# Patient Record
Sex: MALE | Race: WHITE | Employment: OTHER | URBAN - METROPOLITAN AREA
[De-identification: names, ages, dates, MRNs, and addresses within clinical notes are randomized per-mention and may not be internally consistent; named-entity substitution may affect disease eponyms.]

---

## 2020-01-27 ENCOUNTER — HOSPITAL ENCOUNTER (INPATIENT)
Age: 70
LOS: 2 days | Discharge: HOME | DRG: 068 | End: 2020-01-29
Attending: EMERGENCY MEDICINE | Admitting: STUDENT IN AN ORGANIZED HEALTH CARE EDUCATION/TRAINING PROGRAM
Payer: MEDICARE

## 2020-01-27 ENCOUNTER — APPOINTMENT (OUTPATIENT)
Dept: CT IMAGING | Age: 70
DRG: 068 | End: 2020-01-27
Attending: EMERGENCY MEDICINE
Payer: MEDICARE

## 2020-01-27 DIAGNOSIS — G45.3 AMAUROSIS FUGAX OF LEFT EYE: ICD-10-CM

## 2020-01-27 DIAGNOSIS — I65.23 BILATERAL CAROTID ARTERY STENOSIS: ICD-10-CM

## 2020-01-27 DIAGNOSIS — G45.3 AMAUROSIS FUGAX OF RIGHT EYE: ICD-10-CM

## 2020-01-27 DIAGNOSIS — G45.9 TIA (TRANSIENT ISCHEMIC ATTACK): Primary | ICD-10-CM

## 2020-01-27 PROBLEM — I63.9 CVA (CEREBRAL VASCULAR ACCIDENT) (HCC): Status: ACTIVE | Noted: 2020-01-27

## 2020-01-27 LAB
ALBUMIN SERPL-MCNC: 4.1 G/DL (ref 3.5–5)
ALBUMIN/GLOB SERPL: 1.2 {RATIO} (ref 1.1–2.2)
ALP SERPL-CCNC: 79 U/L (ref 45–117)
ALT SERPL-CCNC: 31 U/L (ref 12–78)
ANION GAP SERPL CALC-SCNC: 6 MMOL/L (ref 5–15)
AST SERPL-CCNC: 21 U/L (ref 15–37)
ATRIAL RATE: 77 BPM
BASOPHILS # BLD: 0.1 K/UL (ref 0–0.1)
BASOPHILS NFR BLD: 2 % (ref 0–1)
BILIRUB SERPL-MCNC: 0.6 MG/DL (ref 0.2–1)
BUN SERPL-MCNC: 10 MG/DL (ref 6–20)
BUN/CREAT SERPL: 9 (ref 12–20)
CALCIUM SERPL-MCNC: 9.3 MG/DL (ref 8.5–10.1)
CALCULATED P AXIS, ECG09: -27 DEGREES
CALCULATED R AXIS, ECG10: -11 DEGREES
CALCULATED T AXIS, ECG11: 14 DEGREES
CHLORIDE SERPL-SCNC: 104 MMOL/L (ref 97–108)
CO2 SERPL-SCNC: 28 MMOL/L (ref 21–32)
COMMENT, HOLDF: NORMAL
CREAT SERPL-MCNC: 1.11 MG/DL (ref 0.7–1.3)
DIAGNOSIS, 93000: NORMAL
DIFFERENTIAL METHOD BLD: ABNORMAL
EOSINOPHIL # BLD: 0.7 K/UL (ref 0–0.4)
EOSINOPHIL NFR BLD: 8 % (ref 0–7)
ERYTHROCYTE [DISTWIDTH] IN BLOOD BY AUTOMATED COUNT: 12.2 % (ref 11.5–14.5)
GLOBULIN SER CALC-MCNC: 3.3 G/DL (ref 2–4)
GLUCOSE SERPL-MCNC: 123 MG/DL (ref 65–100)
HCT VFR BLD AUTO: 47.5 % (ref 36.6–50.3)
HGB BLD-MCNC: 15.6 G/DL (ref 12.1–17)
IMM GRANULOCYTES # BLD AUTO: 0 K/UL (ref 0–0.04)
IMM GRANULOCYTES NFR BLD AUTO: 0 % (ref 0–0.5)
INR PPP: 1 (ref 0.9–1.1)
LYMPHOCYTES # BLD: 2.2 K/UL (ref 0.8–3.5)
LYMPHOCYTES NFR BLD: 29 % (ref 12–49)
MCH RBC QN AUTO: 31.2 PG (ref 26–34)
MCHC RBC AUTO-ENTMCNC: 32.8 G/DL (ref 30–36.5)
MCV RBC AUTO: 95 FL (ref 80–99)
MONOCYTES # BLD: 0.6 K/UL (ref 0–1)
MONOCYTES NFR BLD: 8 % (ref 5–13)
NEUTS SEG # BLD: 4.1 K/UL (ref 1.8–8)
NEUTS SEG NFR BLD: 53 % (ref 32–75)
NRBC # BLD: 0 K/UL (ref 0–0.01)
NRBC BLD-RTO: 0 PER 100 WBC
P-R INTERVAL, ECG05: 346 MS
PLATELET # BLD AUTO: 178 K/UL (ref 150–400)
PMV BLD AUTO: 12.4 FL (ref 8.9–12.9)
POTASSIUM SERPL-SCNC: 3.8 MMOL/L (ref 3.5–5.1)
PROT SERPL-MCNC: 7.4 G/DL (ref 6.4–8.2)
PROTHROMBIN TIME: 10.2 SEC (ref 9–11.1)
Q-T INTERVAL, ECG07: 424 MS
QRS DURATION, ECG06: 98 MS
QTC CALCULATION (BEZET), ECG08: 479 MS
RBC # BLD AUTO: 5 M/UL (ref 4.1–5.7)
SAMPLES BEING HELD,HOLD: NORMAL
SODIUM SERPL-SCNC: 138 MMOL/L (ref 136–145)
VENTRICULAR RATE, ECG03: 77 BPM
WBC # BLD AUTO: 7.7 K/UL (ref 4.1–11.1)

## 2020-01-27 PROCEDURE — 99285 EMERGENCY DEPT VISIT HI MDM: CPT

## 2020-01-27 PROCEDURE — 74011636320 HC RX REV CODE- 636/320: Performed by: EMERGENCY MEDICINE

## 2020-01-27 PROCEDURE — 65660000000 HC RM CCU STEPDOWN

## 2020-01-27 PROCEDURE — 85610 PROTHROMBIN TIME: CPT

## 2020-01-27 PROCEDURE — 80053 COMPREHEN METABOLIC PANEL: CPT

## 2020-01-27 PROCEDURE — 74011000258 HC RX REV CODE- 258: Performed by: EMERGENCY MEDICINE

## 2020-01-27 PROCEDURE — 93005 ELECTROCARDIOGRAM TRACING: CPT

## 2020-01-27 PROCEDURE — 70498 CT ANGIOGRAPHY NECK: CPT

## 2020-01-27 PROCEDURE — 70496 CT ANGIOGRAPHY HEAD: CPT

## 2020-01-27 PROCEDURE — 74011250637 HC RX REV CODE- 250/637: Performed by: EMERGENCY MEDICINE

## 2020-01-27 PROCEDURE — 36415 COLL VENOUS BLD VENIPUNCTURE: CPT

## 2020-01-27 PROCEDURE — 70450 CT HEAD/BRAIN W/O DYE: CPT

## 2020-01-27 PROCEDURE — 85025 COMPLETE CBC W/AUTO DIFF WBC: CPT

## 2020-01-27 PROCEDURE — 94760 N-INVAS EAR/PLS OXIMETRY 1: CPT

## 2020-01-27 RX ORDER — ATORVASTATIN CALCIUM 40 MG/1
40 TABLET, FILM COATED ORAL
Status: DISCONTINUED | OUTPATIENT
Start: 2020-01-27 | End: 2020-01-28

## 2020-01-27 RX ORDER — THERA TABS 400 MCG
1 TAB ORAL DAILY
COMMUNITY
End: 2021-04-09

## 2020-01-27 RX ORDER — ACETAMINOPHEN 325 MG/1
650 TABLET ORAL
Status: DISCONTINUED | OUTPATIENT
Start: 2020-01-27 | End: 2020-01-29 | Stop reason: HOSPADM

## 2020-01-27 RX ORDER — MELATONIN
2000 DAILY
Status: DISCONTINUED | OUTPATIENT
Start: 2020-01-28 | End: 2020-01-29 | Stop reason: HOSPADM

## 2020-01-27 RX ORDER — GUAIFENESIN 100 MG/5ML
325 LIQUID (ML) ORAL
Status: COMPLETED | OUTPATIENT
Start: 2020-01-27 | End: 2020-01-27

## 2020-01-27 RX ORDER — ATORVASTATIN CALCIUM 20 MG/1
20 TABLET, FILM COATED ORAL
Status: ON HOLD | COMMUNITY
Start: 2019-12-05 | End: 2020-01-29 | Stop reason: SDUPTHER

## 2020-01-27 RX ORDER — FLUTICASONE PROPIONATE 50 MCG
2 SPRAY, SUSPENSION (ML) NASAL DAILY
Status: DISCONTINUED | OUTPATIENT
Start: 2020-01-28 | End: 2020-01-29 | Stop reason: HOSPADM

## 2020-01-27 RX ORDER — ONDANSETRON 2 MG/ML
4 INJECTION INTRAMUSCULAR; INTRAVENOUS
Status: DISCONTINUED | OUTPATIENT
Start: 2020-01-27 | End: 2020-01-29 | Stop reason: HOSPADM

## 2020-01-27 RX ORDER — GLUCOSAMINE SULFATE 1500 MG
1000 POWDER IN PACKET (EA) ORAL 2 TIMES DAILY
COMMUNITY
Start: 2019-11-30

## 2020-01-27 RX ORDER — CLOPIDOGREL BISULFATE 75 MG/1
150 TABLET ORAL
Status: COMPLETED | OUTPATIENT
Start: 2020-01-27 | End: 2020-01-27

## 2020-01-27 RX ORDER — THERA TABS 400 MCG
1 TAB ORAL DAILY
Status: DISCONTINUED | OUTPATIENT
Start: 2020-01-28 | End: 2020-01-29 | Stop reason: HOSPADM

## 2020-01-27 RX ORDER — ACETAMINOPHEN 650 MG/1
650 SUPPOSITORY RECTAL
Status: DISCONTINUED | OUTPATIENT
Start: 2020-01-27 | End: 2020-01-29 | Stop reason: HOSPADM

## 2020-01-27 RX ORDER — ASPIRIN 81 MG/1
81 TABLET ORAL DAILY
COMMUNITY
Start: 2008-03-31 | End: 2020-01-29

## 2020-01-27 RX ORDER — LOSARTAN POTASSIUM 50 MG/1
100 TABLET ORAL DAILY
Status: DISCONTINUED | OUTPATIENT
Start: 2020-01-28 | End: 2020-01-29 | Stop reason: HOSPADM

## 2020-01-27 RX ORDER — LOSARTAN POTASSIUM AND HYDROCHLOROTHIAZIDE 25; 100 MG/1; MG/1
1 TABLET ORAL DAILY
COMMUNITY
Start: 2019-11-30 | End: 2020-11-06

## 2020-01-27 RX ORDER — ALBUTEROL SULFATE 90 UG/1
2 AEROSOL, METERED RESPIRATORY (INHALATION)
COMMUNITY
End: 2021-02-26 | Stop reason: SDUPTHER

## 2020-01-27 RX ORDER — SODIUM CHLORIDE 0.9 % (FLUSH) 0.9 %
10 SYRINGE (ML) INJECTION
Status: COMPLETED | OUTPATIENT
Start: 2020-01-27 | End: 2020-01-27

## 2020-01-27 RX ORDER — FLUTICASONE PROPIONATE 50 MCG
2 SPRAY, SUSPENSION (ML) NASAL DAILY
COMMUNITY
End: 2021-04-09

## 2020-01-27 RX ADMIN — ASPIRIN 81 MG 324 MG: 81 TABLET ORAL at 21:11

## 2020-01-27 RX ADMIN — IOPAMIDOL 100 ML: 755 INJECTION, SOLUTION INTRAVENOUS at 20:40

## 2020-01-27 RX ADMIN — Medication 10 ML: at 20:40

## 2020-01-27 RX ADMIN — CLOPIDOGREL BISULFATE 150 MG: 75 TABLET ORAL at 22:37

## 2020-01-27 RX ADMIN — ASPIRIN 81 MG 324 MG: 81 TABLET ORAL at 22:37

## 2020-01-27 RX ADMIN — SODIUM CHLORIDE 100 ML: 900 INJECTION, SOLUTION INTRAVENOUS at 20:40

## 2020-01-27 NOTE — ED TRIAGE NOTES
Triage Note: Patient is coming in with loss of vision to the left eye that started yesterday around noon. Patient states was able to blink and the vision returns but the issues has been ongoing and happens about every hour since noon yesterday. Patient has seen 2 eye specialist today and was sent in for work up for a stroke.

## 2020-01-28 ENCOUNTER — APPOINTMENT (OUTPATIENT)
Dept: NON INVASIVE DIAGNOSTICS | Age: 70
DRG: 068 | End: 2020-01-28
Attending: PSYCHIATRY & NEUROLOGY
Payer: MEDICARE

## 2020-01-28 ENCOUNTER — APPOINTMENT (OUTPATIENT)
Dept: MRI IMAGING | Age: 70
DRG: 068 | End: 2020-01-28
Attending: NEUROMUSCULOSKELETAL MEDICINE & OMM
Payer: MEDICARE

## 2020-01-28 ENCOUNTER — APPOINTMENT (OUTPATIENT)
Dept: VASCULAR SURGERY | Age: 70
DRG: 068 | End: 2020-01-28
Attending: STUDENT IN AN ORGANIZED HEALTH CARE EDUCATION/TRAINING PROGRAM
Payer: MEDICARE

## 2020-01-28 LAB
ASPIRIN TEST, ASPIRN: 395 ARU
CHOLEST SERPL-MCNC: 184 MG/DL
EST. AVERAGE GLUCOSE BLD GHB EST-MCNC: 114 MG/DL
HBA1C MFR BLD: 5.6 % (ref 4–5.6)
HDLC SERPL-MCNC: 45 MG/DL
HDLC SERPL: 4.1 {RATIO} (ref 0–5)
LDLC SERPL CALC-MCNC: 102.6 MG/DL (ref 0–100)
LIPID PROFILE,FLP: ABNORMAL
P2Y12 PLT RESPONSE,PPPR: 125 PRU (ref 194–418)
P2Y12 PLT RESPONSE,PPPR: 194 PRU (ref 194–418)
TRIGL SERPL-MCNC: 182 MG/DL (ref ?–150)
VLDLC SERPL CALC-MCNC: 36.4 MG/DL

## 2020-01-28 PROCEDURE — 36415 COLL VENOUS BLD VENIPUNCTURE: CPT

## 2020-01-28 PROCEDURE — 74011000250 HC RX REV CODE- 250: Performed by: NURSE PRACTITIONER

## 2020-01-28 PROCEDURE — 74011250636 HC RX REV CODE- 250/636: Performed by: NURSE PRACTITIONER

## 2020-01-28 PROCEDURE — 97165 OT EVAL LOW COMPLEX 30 MIN: CPT

## 2020-01-28 PROCEDURE — 80061 LIPID PANEL: CPT

## 2020-01-28 PROCEDURE — 70544 MR ANGIOGRAPHY HEAD W/O DYE: CPT

## 2020-01-28 PROCEDURE — 97116 GAIT TRAINING THERAPY: CPT | Performed by: PHYSICAL THERAPIST

## 2020-01-28 PROCEDURE — 94664 DEMO&/EVAL PT USE INHALER: CPT

## 2020-01-28 PROCEDURE — 97161 PT EVAL LOW COMPLEX 20 MIN: CPT | Performed by: PHYSICAL THERAPIST

## 2020-01-28 PROCEDURE — 70551 MRI BRAIN STEM W/O DYE: CPT

## 2020-01-28 PROCEDURE — 70547 MR ANGIOGRAPHY NECK W/O DYE: CPT

## 2020-01-28 PROCEDURE — 65660000000 HC RM CCU STEPDOWN

## 2020-01-28 PROCEDURE — 74011250637 HC RX REV CODE- 250/637: Performed by: NURSE PRACTITIONER

## 2020-01-28 PROCEDURE — 94640 AIRWAY INHALATION TREATMENT: CPT

## 2020-01-28 PROCEDURE — 83036 HEMOGLOBIN GLYCOSYLATED A1C: CPT

## 2020-01-28 PROCEDURE — 93880 EXTRACRANIAL BILAT STUDY: CPT

## 2020-01-28 PROCEDURE — 85576 BLOOD PLATELET AGGREGATION: CPT

## 2020-01-28 PROCEDURE — 74011250637 HC RX REV CODE- 250/637: Performed by: STUDENT IN AN ORGANIZED HEALTH CARE EDUCATION/TRAINING PROGRAM

## 2020-01-28 PROCEDURE — 92610 EVALUATE SWALLOWING FUNCTION: CPT | Performed by: SPEECH-LANGUAGE PATHOLOGIST

## 2020-01-28 PROCEDURE — 74011250637 HC RX REV CODE- 250/637: Performed by: PSYCHIATRY & NEUROLOGY

## 2020-01-28 PROCEDURE — 77030029684 HC NEB SM VOL KT MONA -A

## 2020-01-28 RX ORDER — ATORVASTATIN CALCIUM 40 MG/1
80 TABLET, FILM COATED ORAL
Status: DISCONTINUED | OUTPATIENT
Start: 2020-01-28 | End: 2020-01-29 | Stop reason: HOSPADM

## 2020-01-28 RX ORDER — SODIUM CHLORIDE 9 MG/ML
100 INJECTION, SOLUTION INTRAVENOUS CONTINUOUS
Status: DISCONTINUED | OUTPATIENT
Start: 2020-01-28 | End: 2020-01-29 | Stop reason: HOSPADM

## 2020-01-28 RX ORDER — IPRATROPIUM BROMIDE AND ALBUTEROL SULFATE 2.5; .5 MG/3ML; MG/3ML
3 SOLUTION RESPIRATORY (INHALATION)
Status: COMPLETED | OUTPATIENT
Start: 2020-01-28 | End: 2020-01-28

## 2020-01-28 RX ORDER — CLOPIDOGREL BISULFATE 75 MG/1
75 TABLET ORAL DAILY
Status: DISCONTINUED | OUTPATIENT
Start: 2020-01-28 | End: 2020-01-29 | Stop reason: HOSPADM

## 2020-01-28 RX ORDER — CLOPIDOGREL BISULFATE 75 MG/1
75 TABLET ORAL ONCE
Status: COMPLETED | OUTPATIENT
Start: 2020-01-28 | End: 2020-01-28

## 2020-01-28 RX ORDER — HYDROCHLOROTHIAZIDE 25 MG/1
25 TABLET ORAL DAILY
Status: DISCONTINUED | OUTPATIENT
Start: 2020-01-28 | End: 2020-01-29 | Stop reason: HOSPADM

## 2020-01-28 RX ORDER — IPRATROPIUM BROMIDE AND ALBUTEROL SULFATE 2.5; .5 MG/3ML; MG/3ML
3 SOLUTION RESPIRATORY (INHALATION)
Status: DISCONTINUED | OUTPATIENT
Start: 2020-01-28 | End: 2020-01-29 | Stop reason: HOSPADM

## 2020-01-28 RX ORDER — ASPIRIN 325 MG
325 TABLET ORAL DAILY
Status: DISCONTINUED | OUTPATIENT
Start: 2020-01-28 | End: 2020-01-29 | Stop reason: HOSPADM

## 2020-01-28 RX ADMIN — ATORVASTATIN CALCIUM 80 MG: 40 TABLET, FILM COATED ORAL at 21:11

## 2020-01-28 RX ADMIN — THERA TABS 1 TABLET: TAB at 08:51

## 2020-01-28 RX ADMIN — CLOPIDOGREL BISULFATE 75 MG: 75 TABLET, FILM COATED ORAL at 08:51

## 2020-01-28 RX ADMIN — SODIUM CHLORIDE 100 ML/HR: 900 INJECTION, SOLUTION INTRAVENOUS at 10:30

## 2020-01-28 RX ADMIN — CLOPIDOGREL BISULFATE 75 MG: 75 TABLET ORAL at 09:51

## 2020-01-28 RX ADMIN — LOSARTAN POTASSIUM 100 MG: 50 TABLET, FILM COATED ORAL at 08:51

## 2020-01-28 RX ADMIN — ASPIRIN 325 MG ORAL TABLET 325 MG: 325 PILL ORAL at 08:51

## 2020-01-28 RX ADMIN — HYDROCHLOROTHIAZIDE 25 MG: 25 TABLET ORAL at 08:52

## 2020-01-28 RX ADMIN — IPRATROPIUM BROMIDE AND ALBUTEROL SULFATE 3 ML: .5; 3 SOLUTION RESPIRATORY (INHALATION) at 12:39

## 2020-01-28 RX ADMIN — MELATONIN 2 TABLET: at 08:51

## 2020-01-28 NOTE — CONSULTS
NEUROLOGY   INPATIENT EVALUATION/CONSULTATION       PATIENT NAME: Chet Garcia    MRN: [id number]    REASON FOR CONSULTATION: Recurrent, monocular vision loss     01/28/20      HISTORY OF PRESENT ILLNESS:  Chet Garcia is a 78 y.o. right handed male admitted for evaluation of recurrent, transient monocular vision loss in his left eye. The patient, his wife is unfortunately admitted to the hospital with evidence for significant illness, reports he went to his ophthalmologist yesterday for evaluation of recurrent episodes of monocular vision loss in his left eye occurring 15-20 times starting at 12 PM on Sunday and occurring throughout the day. He states that it would come on gradually and then resolve never affecting his right eye. Denies eye pain or any other associated symptoms. Denies prior occurrence or new medications. Headaches were described as pain in the back of the neck, radiating proximally over his occipital ridge into the vertex and squeezing in quality. Headaches can be severe in pain but are without associated symptoms and not typical for migraines per the patient. He denies any reports of tongue jaw claudication recent night sweats fevers or pain in his girdle muscles. In the ER he underwent CTA which demonstrated the percent stenosis of his right carotid, was loaded with aspirin 650 mg and Plavix 300 mg in the ER and admitted for evaluation of possible TIA. This morning says feels well has not had any recurrent episodes and denies any prior history of stroke or TIA. PAST MEDICAL HISTORY:  Past Medical History:   Diagnosis Date    Hypertension        PAST SURGICAL HISTORY:  Past Surgical History:   Procedure Laterality Date    HX CHOLECYSTECTOMY         FAMILY HISTORY:   No significant past family history reported.      SOCIAL HISTORY:  Social History     Socioeconomic History    Marital status: SINGLE     Spouse name: Not on file    Number of children: Not on file    Years of education: Not on file    Highest education level: Not on file   Tobacco Use    Smoking status: Current Every Day Smoker    Smokeless tobacco: Never Used   Substance and Sexual Activity    Alcohol use: Never     Frequency: Never    Drug use: Never         MEDICATIONS:   Current Facility-Administered Medications   Medication Dose Route Frequency Provider Last Rate Last Dose    hydroCHLOROthiazide (HYDRODIURIL) tablet 25 mg  25 mg Oral DAILY Susan Jennings MD   25 mg at 01/28/20 0852    clopidogreL (PLAVIX) tablet 75 mg  75 mg Oral DAILY Geovanni Rank, NP   75 mg at 01/28/20 1884    aspirin tablet 325 mg  325 mg Oral DAILY Geovanni Rank, NP   325 mg at 01/28/20 0851    atorvastatin (LIPITOR) tablet 80 mg  80 mg Oral QHS Narcisa Brooke MD        0.9% sodium chloride infusion  100 mL/hr IntraVENous CONTINUOUS Mildred Market A,  mL/hr at 01/28/20 1030 100 mL/hr at 01/28/20 1030    albuterol-ipratropium (DUO-NEB) 2.5 MG-0.5 MG/3 ML  3 mL Nebulization NOW Krish Peng NP        albuterol-ipratropium (DUO-NEB) 2.5 MG-0.5 MG/3 ML  3 mL Nebulization Q4H PRN Krish Peng NP        cholecalciferol (VITAMIN D3) (1000 Units /25 mcg) tablet 2 Tab  2,000 Units Oral DAILY Susan Jennings MD   2 Tab at 01/28/20 0851    fluticasone propionate (FLONASE) 50 mcg/actuation nasal spray 2 Spray  2 Spray Both Nostrils DAILY Susan Jennings MD        losartan (COZAAR) tablet 100 mg  100 mg Oral DAILY Susan Jennings MD   100 mg at 01/28/20 0851    therapeutic multivitamin (THERAGRAN) tablet 1 Tab  1 Tab Oral DAILY Susan Jennings MD   1 Tab at 01/28/20 0851    acetaminophen (TYLENOL) tablet 650 mg  650 mg Oral Q4H PRN Susan Jennings MD        Or    acetaminophen (TYLENOL) solution 650 mg  650 mg Per NG tube Q4H PRN Susan Jennings MD        Or    acetaminophen (TYLENOL) suppository 650 mg  650 mg Rectal Q4H PRN Susan Jennings MD        ondansetron (ZOFRAN) injection 4 mg  4 mg IntraVENous Q6H PRN Kit Jennings MD         ALLERGIES:  No Known Allergies    REVIEW OF SYSTEMS:  Pertinent positives/negatives as per HPI, otherwise 14 point review of systems is unremarkable. PHYSICAL EXAM:  Vital Signs:   Visit Vitals  /73   Pulse 66   Temp 98 °F (36.7 °C)   Resp 14   Ht 5' 9.5\" (1.765 m)   Wt 89.7 kg (197 lb 12 oz)   SpO2 97%   BMI 28.78 kg/m²        General Medical Exam:  General:  Well appearing, comfortable, in no apparent distress. Eyes/ENT: No tenderness, nodularity noted in bilateral temples. Neck: No masses appreciated. Full range of motion tenderness to palpation noted over paraspinal muscles in upper cervical region. Respiratory:  Clear to auscultation, good air entry bilaterally. Cardiac:  Regular rate and rhythm, no murmur. GI:  Soft, non-tender, non-distended abdomen. Bowel sounds normal. No masses, organomegaly. Extremities:  No deformities, edema, or skin discoloration. Skin:  No rashes or lesions. Neurological:  · Mental Status:  Alert and oriented to person, place, and time. Attention intact. Speech clear that this of hypophonia. Language appears fluent evidence for aphasia. · Cranial Nerves:   CNII/III/IV/VI: visual fields full to confrontation, EOMI, PERRL, no ptosis or nystagmus. Funduscopy not performed. CN V: Facial sensation intact bilaterally, masseter 5/5   CN VII: Facial muscles symmetric and strong   CN VIII: Hearing intact to spoken voice. CN IX/X: Normal palatal movement   CN XI: Full strength shoulder shrug bilaterally   CN XII: Tongue protrusion full and midline without fasciculation or atrophy  · Motor: Normal tone and muscle bulk with no pronator drift. No atrophy or fasciculations present on examination.   Individual muscle group testing:  Shoulder abduction:   Left:5/5   Right : 5/5    Elbow flexion:      Left:5/5   Right : 5/5  Elbow extension:    Left:5/5   Right : 5/5   Wrist flexion:    Left:5/5   Right : 5/5  Wrist extension:    Left:5/5   Right : 5/5  Finger flexion:    Left:5/5   Right : 5/5    Finger extension:   Left:5/5   Right : 5/5   Hip flexion:     Left:5/5   Right : 5/5         Hip extension:   Left:5/5   Right : 5/5    Knee flexion:    Left:5/5   Right : 5/5    Knee extension:   Left:5/5   Right : 5/5    Dorsiflexion:     Left:5/5   Right : 5/5  Plantar flexion:    Left:5/5   Right : 5/5    Foot inversion:    Left:5/5   Right : 5/5   Foot eversion:    Left:5/5   Right : 5/5    · MSRs: No crossed adductors or clonus. RIGHT  LEFT   Brachioradialis 1+ 1+   Biceps 1+ 1+   Triceps 1+ 1+   Knee 2+ 0   Achilles 0 0        Plantar response Neutral Upward   Caleb/Troemner signs Negative Negative     · Sensation: Normal and symmetric perception of temperature, light touch  · Coordination: No dysmetria. Normal rapid alternating movements in upper extremities. · Gait: Primary gait and station intact. PERTINENT DATA:    CBC: WBC 7.7, Hgb 15.6, Plt 170  BMP: Na 138, K 3.8, Cl 104, BUN/Creat 10/1.11, Ca 9.3  Lipid panel: Chol 184, HDL 45, VLDL 36.4, .6  HbA1c 5.6%    CT Results (maximum last 3): Results from [place] encounter on 01/27/20   CTA HEAD    Narrative EXAM:  CTA HEAD, CTA NECK    INDICATION:   tia    COMPARISON:  None    CONTRAST: 100 mL of Isovue-370Optiray-350. TECHNIQUE:   Unenhanced CT of the head was performed. Following this, multislice helical CT  angiography of the head was performed during uneventful rapid bolus intravenous  administration of contrast. Coronal and sagittal reformations and MIP images and  3D shaded surface display renderings were generated. CT dose reduction was  achieved through use of a standardized protocol tailored for this examination  and automatic exposure control for dose modulation. FINDINGS:  NASCET Criteria . Head CT obtained after intravenous contrast show no enhancing lesion or masses.     There is a 80% + percent stenosis at the origin of the right internal carotid. Distal flow is demonstrated. There is no significant left carotid bifurcation disease. Origins of the great vessels from the arch appear unremarkable. There is no intracranial stenosis or vascular malformation. Impression IMPRESSION: Significant right carotid bifurcation disease. CTA NECK    Narrative EXAM:  CTA HEAD, CTA NECK    INDICATION:   tia    COMPARISON:  None    CONTRAST: 100 mL of Isovue-370Optiray-350. TECHNIQUE:   Unenhanced CT of the head was performed. Following this, multislice helical CT  angiography of the head was performed during uneventful rapid bolus intravenous  administration of contrast. Coronal and sagittal reformations and MIP images and  3D shaded surface display renderings were generated. CT dose reduction was  achieved through use of a standardized protocol tailored for this examination  and automatic exposure control for dose modulation. FINDINGS:  NASCET Criteria . Head CT obtained after intravenous contrast show no enhancing lesion or masses. There is a 80% + percent stenosis at the origin of the right internal carotid. Distal flow is demonstrated. There is no significant left carotid bifurcation disease. Origins of the great vessels from the arch appear unremarkable. There is no intracranial stenosis or vascular malformation. Impression IMPRESSION: Significant right carotid bifurcation disease. CT HEAD WO CONT    Narrative EXAM: CT HEAD WO CONT    INDICATION: Loss of vision to the left eye    COMPARISON: None. CONTRAST: None. TECHNIQUE: Unenhanced CT of the head was performed using 5 mm images. Brain and  bone windows were generated. CT dose reduction was achieved through use of a  standardized protocol tailored for this examination and automatic exposure  control for dose modulation. FINDINGS:  There is no extra-axial fluid collection, hemorrhage or shift. No masses.       Impression IMPRESSION: Negative. ASSESSMENT:      Melvin Alejandra is a 78year old RH dominant male with history of multiple vascular risk factors referred by Opthalmology for evaluation of recurrent episodes of monocular vision loss with incidental finding of right internal carotid artery stenosis    RECOMMENDATIONS:  Recurrent transient visual impairment OS:  Differential includes recurrent occlusion of retinal artery v. Hypoperfusion v. Inflammatory v. Primary ocular process v. Headache-related phenomenon     Regarding this possibilities, patient appears to describe cervicogenic/tension-type headaches, unlikely to be associated    Similarly, while GCA can cause amarousis, no temple artery tenderness/nodularity is appreciated, no optic nerve abnormalities noted on ophtho evaluation (with no ocular findings otherwise reported)    Given that symptoms were contralateral to carotid stenosis, difficult to implicate this lesion (which warrants further investigation/intervention given degree of stenosis regardless) either from thromboembolic perspective or secondary to hypoperfusion (ie if left carotid were occluded etc)    Overall given recurrent nature of episodes, recurrent TIA occurring 15-20 times in a idea would be unusual. Nevertheless, given this possibility, TTE remains pending, patient has been placed on high-intensity statin and dual antithrombotics (with marginal response to clopidogrel)  MRI remains pending as well    Ultimately, symptoms may be secondary to retinal artery spasm.  May consider trial of calcium channel blocker is symptoms recur, particularly after completion of cerebrovascular evaluation    Left L3-L4 radiculopathy, positive Babinski  Patient endorses history of back pain with a radicular component chronically as well as neck pain  Brain MRI pending, otherwise can evaluate possibility of cervical degenerative disease, lumbosacral radiculopathy as an outpatient    Neurology will continue to follow, please call with questions/concerns.         Keeley Newman MD

## 2020-01-28 NOTE — PROGRESS NOTES
OCCUPATIONAL THERAPY EVALUATION/DISCHARGE  Patient: Patricia Portillo (52 y.o. male)  Date: 1/28/2020  Primary Diagnosis: CVA (cerebral vascular accident) [facility]) [I63.9]       Precautions: none       ASSESSMENT  Based on the objective data described below, the patient presents with at independent functional baseline and has no OT needs at this time. Patient reports and demonstrates visual deficits resolved at this time and demonstrates no other focal neuro deficits. Patient was receptive to education on BEFAST signs/ symptoms of CVA. He has no concerns for completing daily activities upon d/c. Current Level of Function (ADLs/self-care): independent    Functional Outcome Measure: The patient scored Total A-D  Total A-D (Motor Function): 66/66 on the Fugl-Poole Assessment with BUE which is indicative of no impairment in upper extremity functional status. PLAN :  Recommendation for discharge: (in order for the patient to meet his/her long term goals)  No skilled occupational therapy/ follow up rehabilitation needs identified at this time. This discharge recommendation:  Has not yet been discussed the attending provider and/or case management       SUBJECTIVE:   Patient stated [facility] vision is back to normal now.     OBJECTIVE DATA SUMMARY:   HISTORY:   Past Medical History:   Diagnosis Date    Hypertension      Past Surgical History:   Procedure Laterality Date    HX CHOLECYSTECTOMY         Prior Level of Function/Environment/Context: independent, ambulatory with no AD  Expanded or extensive additional review of patient history:     Home Situation  Home Environment: Private residence  # Steps to Enter: 1  One/Two Story Residence: One story  Living Alone: No  Support Systems: Child(nikki)  Patient Expects to be Discharged to[de-identified] Private residence  Current DME Used/Available at Home: None    EXAMINATION OF PERFORMANCE DEFICITS:  Cognitive/Behavioral Status:  Neurologic State: Alert  Orientation Level: Oriented X4  Cognition: Appropriate decision making; Appropriate for age attention/concentration; Appropriate safety awareness; Follows commands  Perception: Appears intact  Perseveration: No perseveration noted  Safety/Judgement: Awareness of environment; Insight into deficits    Skin: visible skin appears intact    Edema: none noted    Hearing: Auditory  Auditory Impairment: None    Vision/Perceptual:    Tracking: Able to track stimulus in all quadrants w/o difficulty    Saccades: Within Defined Limits         Visual Fields: (Able to detect stimuli in all fields)  Diplopia: No    Acuity: Within Defined Limits         Range of Motion:    AROM: Within functional limits                         Strength:    Strength: Within functional limits                Coordination:  Coordination: Within functional limits  Fine Motor Skills-Upper: Left Intact; Right Intact    Gross Motor Skills-Upper: Left Intact; Right Intact    Tone & Sensation:    Tone: Normal  Sensation: Intact                      Balance:  Sitting: Intact  Standing: Intact    Functional Mobility and Transfers for ADLs:  Bed Mobility:  Rolling: Modified independent  Supine to Sit: Modified independent  Sit to Supine: Modified independent  Scooting: Modified independent    Transfers:  Sit to Stand: Stand-by assistance  Stand to Sit: Stand-by assistance    ADL Assessment:  Feeding: Independent    Oral Facial Hygiene/Grooming: Independent    Bathing: Independent    Upper Body Dressing: Independent    Lower Body Dressing: Independent    Toileting: Independent                ADL Intervention and task modifications:          Cognitive Retraining  Safety/Judgement: Awareness of environment; Insight into deficits    Functional Measure:  Fugl-Poole Assessment of Motor Recovery after Stroke:   Reflex Activity  Flexors/Biceps/Fingers: Can be elicited  Extensors/Triceps: Can be elicited  Reflex Subtotal: 4    Volitional Movement Within Synergies  Shoulder Retraction: Full  Shoulder Elevation: Full  Shoulder Abduction (90 degrees): Full  Shoulder External Rotation: Full  Elbow Flexion: Full  Forearm Supination: Full  Shoulder Adduction/Internal Rotation: Full  Elbow Extension: Full  Forearm Pronation: Full  Subtotal: 18    Volitional Movement Mixing Synergies  Hand to Lumbar Spine: Full  Shoulder Flexion (0-90 degrees): Full  Pronation-Supination: Full  Subtotal: 6    Volitional Movement With Little or No Synergy  Shoulder Abduction (0-90 degrees): Full  Shoulder Flexion ( degrees): Full  Pronation/Supination: Full  Subtotal : 6    Normal Reflex Activity  Biceps, Triceps, Finger Flexors: Full  Subtotal : 2    Upper Extremity Total   Upper Extremity Total: 36    Wrist  Stability at 15 Degree Dorsiflexion: Full  Repeated Dorsiflexion/ Volar Flexion: Full  Stability at 15 Degree Dorsiflexion: Full  Repeated Dorsiflexion/ Volar Flexion: Full  Circumduction: Full  Wrist Total: 10    Hand  Mass Flexion: Full  Mass Extension: Full  Grasp A: Full  Grasp B: Full  Grasp C: Full  Grasp D: Full  Grasp E: Full  Hand Total: 14    Coordination/Speed  Tremor: None  Dysmetria: None  Time: <1s  Coordination/Speed Total : 6    Total A-D  Total A-D (Motor Function): 66/66     This is a reliable/valid measure of arm function after a neurological event. It has established value to characterize functional status and for measuring spontaneous and therapy-induced recovery; tests proximal and distal motor functions. Fugl-Poole Assessment - UE scores recorded between five and 30 days post neurologic event can be used to predict UE recovery at six months post neurologic event. Severe = 0-21 points   Moderately Severe = 22-33 points   Moderate = 34-47 points   Mild = 48-66 points  OLIVIA Lopez, PINO Mcclendon, & MARGARITA Gentile (1992). Measurement of motor recovery after stroke: Outcome assessment and sample size requirements. Stroke, 23, pp. 0442-4272. ------------------------------------------------------------------------------------------------------------------------------------------------------------------  MCID:  Stroke:   Sunshine Dasilva et al, 2001; n = 171; mean age 79 (6) years; assessed within 16 (12) days of stroke, Acute Stroke)  FMA Motor Scores from Admission to Discharge   10 point increase in FMA Upper Extremity = 1.5 change in discharge FIM   10 point increase in FMA Lower Extremity = 1.9 change in discharge FIM  MDC:   Stroke:   Haroldo Rangel et al, 2008, n = 14, mean age = 59.9 (14.6) years, assessed on average 14 (6.5) months post stroke, Chronic Stroke)   FMA = 5.2 points for the Upper Extremity portion of the assessment     Occupational Therapy Evaluation Charge Determination   History Examination Decision-Making   LOW Complexity : Brief history review  LOW Complexity : 1-3 performance deficits relating to physical, cognitive , or psychosocial skils that result in activity limitations and / or participation restrictions  MEDIUM Complexity : Patient may present with comorbidities that affect occupational performnce. Miniml to moderate modification of tasks or assistance (eg, physical or verbal ) with assesment(s) is necessary to enable patient to complete evaluation       Based on the above components, the patient evaluation is determined to be of the following complexity level: LOW   Pain Rating:  Patient did not report pain     Activity Tolerance:   Good    After treatment patient left in no apparent distress:    Supine in bed, Call bell within reach and Caregiver / family present    COMMUNICATION/EDUCATION:   The patients plan of care was discussed with: Physical Therapist and Registered Nurse. Patient and/or family was verbally educated on the BE FAST acronym for signs/symptoms of CVA and TIA. Provided with BEFAST handout. All questions answered with patient indicating good understanding.      Thank you for this referral.  Max Huynh, OT  Time Calculation: 13 mins

## 2020-01-28 NOTE — PROGRESS NOTES
Per Dr. Alex Reno via perfect serve text, neuro interventional recommend admitting patient to neuro stepdown.

## 2020-01-28 NOTE — PROGRESS NOTES
Problem: Patient Education: Go to Patient Education Activity  Goal: Patient/Family Education  Outcome: Progressing Towards Goal     Problem: TIA/CVA Stroke: 0-24 hours  Goal: Activity/Safety  Outcome: Progressing Towards Goal  Goal: Consults, if ordered  Outcome: Progressing Towards Goal  Goal: Diagnostic Test/Procedures  Outcome: Progressing Towards Goal  Goal: Nutrition/Diet  Outcome: Progressing Towards Goal  Goal: Medications  Outcome: Progressing Towards Goal  Goal: Respiratory  Outcome: Progressing Towards Goal  Goal: Treatments/Interventions/Procedures  Outcome: Progressing Towards Goal  Goal: Minimize risk of bleeding post-thrombolytic infusion  Outcome: Progressing Towards Goal  Goal: Monitor for complications post-thrombolytic infusion  Outcome: Progressing Towards Goal  Goal: Psychosocial  Outcome: Progressing Towards Goal  Goal: *Hemodynamically stable  Outcome: Progressing Towards Goal  Goal: *Neurologically stable  Description  Absence of additional neurological deficits    Outcome: Progressing Towards Goal  Goal: *Verbalizes anxiety and depression are reduced or absent  Outcome: Progressing Towards Goal  Goal: *Absence of Signs of Aspiration on Current Diet  Outcome: Progressing Towards Goal  Goal: *Absence of deep venous thrombosis signs and symptoms(Stroke Metric)  Outcome: Progressing Towards Goal     Problem: Pressure Injury - Risk of  Goal: *Prevention of pressure injury  Description  Document Pawel Scale and appropriate interventions in the flowsheet. Outcome: Progressing Towards Goal  Note: Pressure Injury Interventions:                                            Problem: Patient Education: Go to Patient Education Activity  Goal: Patient/Family Education  Outcome: Progressing Towards Goal     Problem: Falls - Risk of  Goal: *Absence of Falls  Description  Document Blondie Hover Fall Risk and appropriate interventions in the flowsheet.   Outcome: Progressing Towards Goal  Note: Fall Risk Interventions:                                Problem: Patient Education: Go to Patient Education Activity  Goal: Patient/Family Education  Outcome: Progressing Towards Goal     Problem: Pain  Goal: *Control of Pain  Outcome: Progressing Towards Goal     Problem: Patient Education: Go to Patient Education Activity  Goal: Patient/Family Education  Outcome: Progressing Towards Goal

## 2020-01-28 NOTE — ROUTINE PROCESS
TRANSFER - OUT REPORT:    Verbal report given to Laura RN (name) on Prieto Doherty  being transferred to [address] (unit) for routine progression of care       Report consisted of patients Situation, Background, Assessment and   Recommendations(SBAR). Information from the following report(s) SBAR, ED Summary, [facility] and Recent Results was reviewed with the receiving nurse. Lines:   Peripheral IV 01/27/20 Right Antecubital (Active)        Opportunity for questions and clarification was provided.       Patient transported with:   Monitor  Registered Nurse

## 2020-01-28 NOTE — PROGRESS NOTES
Attempted to see for swallowing evaluation, however, per discussion with neurointerventional NP, plan for stent placement this afternoon and patient therefore NPO. Will follow up tomorrow as medically appropriate. Recommend complete nsg dysphagia screen post procedure prior to resuming PO intake. Thanks. Cleda Bosworth, M.CD.  CCC-SLP

## 2020-01-28 NOTE — PROGRESS NOTES
Admission Medication Reconciliation:    Information obtained from:  patient, rx query  RxQuery data available¹:  YES    Comments/Recommendations: Updated PTA meds/reviewed patient's allergies. 1)  Patient is a fairly good historian. 2)  Medication changes (since last review): Added  - all of the medications noted       ¹RxQuery pharmacy benefit data reflects medications filled and processed through the patient's insurance, however   this data does NOT capture whether the medication was picked up or is currently being taken by the patient. Allergies:  Patient has no known allergies. Significant PMH/Disease States:   Past Medical History:   Diagnosis Date    Hypertension      Chief Complaint for this Admission:    Chief Complaint   Patient presents with    Loss of Vision     Prior to Admission Medications:   Prior to Admission Medications   Prescriptions Last Dose Informant Taking? albuterol (PROVENTIL HFA, VENTOLIN HFA, PROAIR HFA) 90 mcg/actuation inhaler   Yes   Sig: Take 2 Puffs by inhalation every six (6) hours as needed for Wheezing. aspirin delayed-release 81 mg tablet 2020 at Unknown time  Yes   Sig: Take 81 Tabs by mouth daily. atorvastatin (LIPITOR) 20 mg tablet 2020 at Unknown time  Yes   Sig: Take 20 mg by mouth nightly. cholecalciferol (VITAMIN D3) 25 mcg (1,000 unit) cap 2020 at Unknown time  Yes   Sig: Take 2,000 Units by mouth daily. fluticasone propionate (FLONASE ALLERGY RELIEF) 50 mcg/actuation nasal spray 2020 at Unknown time  Yes   Si Sprays by Both Nostrils route daily. losartan-hydroCHLOROthiazide (HYZAAR) 100-25 mg per tablet 2020 at Unknown time  Yes   Sig: Take 1 Tab by mouth daily. therapeutic multivitamin (THERAGRAN) tablet 2020 at Unknown time  Yes   Sig: Take 1 Tab by mouth daily.       Facility-Administered Medications: None       Please contact the main inpatient pharmacy with any questions or concerns at [phone] and we will direct you to the clinical pharmacist covering this patient's care while in-house.    Dayna rosa

## 2020-01-28 NOTE — PROGRESS NOTES
Bedside and Verbal shift change report given to Verna BOTELLO (oncoming nurse) by Ness Buckley RN (offgoing nurse). Report included the following information SBAR, Kardex, ED Summary, Procedure Summary, Intake/Output, MAR, Recent Results, Cardiac Rhythm NSR  and Dual Neuro Assessment.

## 2020-01-28 NOTE — PROGRESS NOTES
SPEECH PATHOLOGY BEDSIDE SWALLOW EVALUATION/DISCHARGE  Patient: Tam Giles [de-identified]78 y.o. male)  Date: 1/28/2020  Primary Diagnosis: CVA (cerebral vascular accident) [facility]) [I63.9]       Precautions: Fall       ASSESSMENT :  Discussed with NP and cleared for swallow evaluation. Based on the objective data described below, the patient presents with a functional swallow with no s/s of aspiration on trials of purees, solids, and thin liquids by cup and straw. Patient reported no changes in swallowing. No reported changes in speech or language so formal evaluation not indicated. Skilled acute therapy provided by a speech-language pathologist is not indicated at this time. PLAN :  Recommendations:  -- Regular diet  Discharge Recommendations: To Be Determined     SUBJECTIVE:   Patient stated my left eye was closing all by itself when answering orientation question about why he is at the hospital.    OBJECTIVE:     Past Medical History:   Diagnosis Date    Hypertension      Past Surgical History:   Procedure Laterality Date    HX CHOLECYSTECTOMY       Prior Level of Function/Home Situation:   Home Situation  Home Environment: Private residence  # Steps to Enter: 1  One/Two Story Residence: One story  Living Alone: No  Support Systems: Child(nikki)  Patient Expects to be Discharged to[de-identified] Private residence  Current DME Used/Available at Home: None  Diet prior to admission: Regular  Current Diet:  Regular   Cognitive and Communication Status:  Neurologic State: Alert  Orientation Level: Oriented X4  Cognition: Appropriate decision making, Appropriate safety awareness, Follows commands  Perception: Appears intact  Perseveration: No perseveration noted  Safety/Judgement: Awareness of environment  Oral Assessment:  Oral Assessment  Labial: No impairment  Dentition: Natural;Limited  Lingual: No impairment  Velum: No impairment  Mandible: No impairment  P.O.  Trials:  Patient Position: Upright in bed  Vocal quality prior to P.O.: No impairment  Consistency Presented: Solid;Puree; Thin liquid  How Presented: Self-fed/presented;Cup/sip;Cup/gulp; Spoon;Straw;Successive swallows     Bolus Acceptance: No impairment  Bolus Formation/Control: No impairment     Propulsion: No impairment  Oral Residue: None  Initiation of Swallow: No impairment  Laryngeal Elevation: Functional  Aspiration Signs/Symptoms: None  Pharyngeal Phase Characteristics: No impairment, issues, or problems   Effective Modifications: None  Cues for Modifications: None       Oral Phase Severity: No impairment  Pharyngeal Phase Severity : No impairment  NOMS:   The NOMS functional outcome measure was used to quantify this patient's level of swallowing impairment. Based on the NOMS, the patient was determined to be at level 7 for swallow function     NOMS Swallowing Levels:  Level 1 (CN): NPO  Level 2 (CM): NPO but takes consistency in therapy  Level 3 (CL): Takes less than 50% of nutrition p.o. and continues with nonoral feedings; and/or safe with mod cues; and/or max diet restriction  Level 4 (CK): Safe swallow but needs mod cues; and/or mod diet restriction; and/or still requires some nonoral feeding/supplements  Level 5 (CJ): Safe swallow with min diet restriction; and/or needs min cues  Level 6 (CI): Independent with p.o.; rare cues; usually self cues; may need to avoid some foods or needs extra time  Level 7 ([address] [place] [address]): Independent for all p.o.  SATINDER. (2003). National Outcomes Measurement System (NOMS): Adult Speech-Language Pathology User's Guide. Pain:  Pain Scale 1: Numeric (0 - 10)  Pain Intensity 1: 0     After treatment:   Patient left in no apparent distress in bed, Call bell within reach and Nursing notified    COMMUNICATION/EDUCATION:   Patient was educated regarding his functional swallow as this relates to his diagnosis of CVA.   He demonstrated Excellent understanding as evidenced by his verbal agreement/.    The patient's plan of care including recommendations, planned interventions, and recommended diet changes were discussed with: Registered Nurse. Thank you for this referral.  ANTONIO Arango., Student SLP       Regarding student involvement in patient care:  A student participated in this treatment session. Per CMS Medicare statements and SATINDER guidelines I certify that the following was true:  1. I was present and directly observed the entire session. 2. I made all skilled judgments and clinical decisions regarding care. 3. I am the practitioner responsible for assessment, treatment, and documentation.

## 2020-01-28 NOTE — CONSULTS
Neurointerventional [address], NP  Neurocritical Care Nurse Practitioner  [phone]    Patient: Chet Garcia MRN: [id number]  SSN: [id number]    YOB: 1950  Age: 78 y.o. Sex: male      Chief Complaint: Intermittent temporary vision loss in left eye lasting a few seconds at a time. Subjective:      Chet Garcia is a 78 y.o. male with a past medical history of HTN. He came to the ER tonight at the recommendation of his Ophthalmologist due to multiple episodes of left eye vision loss beginning yesterday 01/26 at noon. States he will just suddenly and completely lose vision for a few seconds in the left eye. Says this has happened approximately 15-20 times. States he blinks and his vision comes back. Earlier today he saw the Ophthalmologist Dr. Jonathan Delarosa who did not find any occular issues and suggested that he may be having TIAs and amaurosis fugax. On arrival to the ER he had CTH and CTA head and neck. His CTH was negative for acute findings. CTA of the head and neck revealed 80%+ stenosis at the origin of the right internal carotid artery with no significant left carotid artery disease. No intracranial stenosis or vascular malformation noted. Denies any flashing lights, headaches, weakness, difficulty speaking or swallowing. Denies numbness, tingling, chest pain, palpitations, leg pain, nausea, vomiting, difficulty swallowing, balance issues or dyspnea. Past Medical History:   Diagnosis Date    Hypertension      History reviewed. No pertinent family history. Social History     Tobacco Use    Smoking status: Current Every Day Smoker    Smokeless tobacco: Never Used   Substance Use Topics    Alcohol use: Never     Frequency: Never      Prior to Admission Medications   Prescriptions Last Dose Informant Patient Reported? Taking?    albuterol (PROVENTIL HFA, VENTOLIN HFA, PROAIR HFA) 90 mcg/actuation inhaler   Yes Yes   Sig: Take 2 Puffs by inhalation every six (6) hours as needed for Wheezing. aspirin delayed-release 81 mg tablet 2020 at Unknown time  Yes Yes   Sig: Take 81 Tabs by mouth daily. atorvastatin (LIPITOR) 20 mg tablet 2020 at Unknown time  Yes Yes   Sig: Take 20 mg by mouth nightly. cholecalciferol (VITAMIN D3) 25 mcg (1,000 unit) cap 2020 at Unknown time  Yes Yes   Sig: Take 2,000 Units by mouth daily. fluticasone propionate (FLONASE ALLERGY RELIEF) 50 mcg/actuation nasal spray 2020 at Unknown time  Yes Yes   Si Sprays by Both Nostrils route daily. losartan-hydroCHLOROthiazide (HYZAAR) 100-25 mg per tablet 2020 at Unknown time  Yes Yes   Sig: Take 1 Tab by mouth daily. therapeutic multivitamin (THERAGRAN) tablet 2020 at Unknown time  Yes Yes   Sig: Take 1 Tab by mouth daily. Facility-Administered Medications: None       No Known Allergies    Review of Systems:   See HPI for pertinent details. Objective:     Vitals:    20 2130 20 2200 20 2230 20 2300   BP: (!) 158/94 149/77 136/72 131/73   Pulse: 76 71 71 67   Resp: 17 11 15 14   Temp:    97.8 °F (36.6 °C)   SpO2: 98% 97% 93% 95%   Weight:       Height:          Physical Exam:  GENERAL: Calm, cooperative, NAD  LUNGS: CTA  CV: RRR, No murmur or rub. SKIN: Warm, dry, color appropriate for ethnicity. Neurologic Exam:  Mental Status:  Alert and oriented x 4. Appropriate affect, mood and behavior. Language:    Normal fluency, repetition, comprehension and naming. Cranial Nerves:   Pupils 3 mm, equal, round and reactive to light. Visual fields full to confrontation. Extraocular movements intact. Facial sensation intact. Full facial strength, no asymmetry. Hearing grossly intact bilaterally. No dysarthria. Tongue protrudes to midline, palate elevates symmetrically. Shoulder shrug 5/5 bilaterally. Motor:    No pronator drift.       Bulk and tone normal.      5/5 power in all extremities proximally and distally. No involuntary movements. Sensation:    Sensation intact throughout to light touch. Coordination & Gait: Normal. FTN and HTS intact with no ataxia present. Gait deferred. NIHSS:      1a-LOC:0    1b-Month/Age:0    1c-Open/Close Hand:0    2-Best Gaze:0    3-Visual Fields:0    4-Facial Palsy:0    5a-Left Arm:0    5b-Right Arm:0    6a-Left Le    6b-Right Le    7-Limb Ataxia:0    8-Sensory:0    9-Best Language:0    10-Dysarthria:0    11-Extinction/Inattention:0  TOTAL SCORE:0      Labs:  Lab Results   Component Value Date/Time    WBC 7.7 2020 05:17 PM    HGB 15.6 2020 05:17 PM    HCT 47.5 2020 05:17 PM    PLATELET 683  05:17 PM    MCV 95.0 2020 05:17 PM      Lab Results   Component Value Date/Time    Sodium 138 2020 05:17 PM    Potassium 3.8 2020 05:17 PM    Chloride 104 2020 05:17 PM    CO2 28 2020 05:17 PM    Anion gap 6 2020 05:17 PM    Glucose 123 (H) 2020 05:17 PM    BUN 10 2020 05:17 PM    Creatinine 1.11 2020 05:17 PM    BUN/Creatinine ratio 9 (L) 2020 05:17 PM    GFR est AA >60 2020 05:17 PM    GFR est non-AA >60 2020 05:17 PM    Calcium 9.3 2020 05:17 PM       Imaging:    CT Results (maximum last 3): Results from [place] encounter on 20   CTA HEAD    Narrative EXAM:  CTA HEAD, CTA NECK    INDICATION:   tia    COMPARISON:  None    CONTRAST: 100 mL of Isovue-370Optiray-350. TECHNIQUE:   Unenhanced CT of the head was performed. Following this, multislice helical CT  angiography of the head was performed during uneventful rapid bolus intravenous  administration of contrast. Coronal and sagittal reformations and MIP images and  3D shaded surface display renderings were generated. CT dose reduction was  achieved through use of a standardized protocol tailored for this examination  and automatic exposure control for dose modulation. FINDINGS:  NASCET Criteria . Head CT obtained after intravenous contrast show no enhancing lesion or masses. There is a 80% + percent stenosis at the origin of the right internal carotid. Distal flow is demonstrated. There is no significant left carotid bifurcation disease. Origins of the great vessels from the arch appear unremarkable. There is no intracranial stenosis or vascular malformation. Impression IMPRESSION: Significant right carotid bifurcation disease. CTA NECK    Narrative EXAM:  CTA HEAD, CTA NECK    INDICATION:   tia    COMPARISON:  None    CONTRAST: 100 mL of Isovue-370Optiray-350. TECHNIQUE:   Unenhanced CT of the head was performed. Following this, multislice helical CT  angiography of the head was performed during uneventful rapid bolus intravenous  administration of contrast. Coronal and sagittal reformations and MIP images and  3D shaded surface display renderings were generated. CT dose reduction was  achieved through use of a standardized protocol tailored for this examination  and automatic exposure control for dose modulation. FINDINGS:  NASCET Criteria . Head CT obtained after intravenous contrast show no enhancing lesion or masses. There is a 80% + percent stenosis at the origin of the right internal carotid. Distal flow is demonstrated. There is no significant left carotid bifurcation disease. Origins of the great vessels from the arch appear unremarkable. There is no intracranial stenosis or vascular malformation. Impression IMPRESSION: Significant right carotid bifurcation disease. CT HEAD WO CONT    Narrative EXAM: CT HEAD WO CONT    INDICATION: Loss of vision to the left eye    COMPARISON: None. CONTRAST: None. TECHNIQUE: Unenhanced CT of the head was performed using 5 mm images. Brain and  bone windows were generated.   CT dose reduction was achieved through use of a  standardized protocol tailored for this examination and automatic exposure  control for dose modulation. FINDINGS:  There is no extra-axial fluid collection, hemorrhage or shift. No masses. Impression IMPRESSION: Negative. Assessment:     Hospital Problems  Never Reviewed          Codes Class Noted POA    CVA (cerebral vascular accident) [facility]) ICD-10-CM: I63.9  ICD-9-CM: 434.91  1/27/2020 Unknown            Plan:     1.) Amaurosis fugax. Transient monocular visual loss. Potential causes include: Carotid artery disease, giant cell arteritis, cardioembolic disease.    - Seen by Ophthalmologist Dr. Ashely Dalal earlier today who did not see any acute occular findings and  suggested possible TIAs. -TIA workup per protocol.    -Recommend Neurology consult. 2. ) Carotid stenosis. CTA H & N shows 80% + stenosis noted at the origin of the right internal carotid artery. No significant left carotid artery disease noted. - Aspirin 650 mg and Plavix 150 mg loading doses given stat in ER. P2Y12 and aspirin levels 3 hours after doses  given. Will adjust doses accordingly when levels come back.    -Carotid duplex in the am.    -Okay to go to stepdown unit. I have discussed the diagnosis and the intended plan as seen in the above orders with Dr. Emilia Coburn, Dr. Louie Fish, the patient and the primary RN. Patient updated on current plan of care and is in agreement. All questions were answered. Thank you for this consult and participating in the care of this patient.   Signed By: Lisa Heard NP     January 27, 2020

## 2020-01-28 NOTE — PROGRESS NOTES
Hospitalist Progress Note    NAME: Jose Gomes   :  1950   MRN:  [id number]       Assessment / Plan:  Amaurosis fugax  (Transient left eye visual loss)  Right carotid artery stenosis   CT stroke protocol revealed significant right carotid artery bifurcation disease   Telemetry monitoring   Neuro interventional surgery on board  Received ASA and Plavix  Aspirin test less than 550  lipitor increased to 80mg daily  Neurology consult   PT-OT evaluation   SLP evaluation   Neuro check as per stroke unit     Hypertension  stable     Hyperlipidemia  Continue statin     Tobacco use  Counseled for cessation           Patient's Baseline: Ambulates with walking  DVT ppx: SCD  Code status:Full   Disposition: TBD  Care plan discussed with patient/family and nurse.         25.0 - 29.9 Overweight / Body mass index is 28.78 kg/m². Subjective:     Chief Complaint / Reason for Physician Visit  \"no pain or chills\". Discussed with RN events overnight. Review of Systems:  Symptom Y/N Comments  Symptom Y/N Comments   Fever/Chills    Chest Pain     Poor Appetite    Edema     Cough    Abdominal Pain     Sputum    Joint Pain     SOB/JARA    Pruritis/Rash     Nausea/vomit    Tolerating PT/OT     Diarrhea    Tolerating Diet     Constipation    Other       Could NOT obtain due to:      Objective:     VITALS:   Last 24hrs VS reviewed since prior progress note.  Most recent are:  Patient Vitals for the past 24 hrs:   Temp Pulse Resp BP SpO2   20 1005 98 °F (36.7 °C) 66 14 128/73 --   20 0851 -- 70 -- 121/78 --   20 0800 -- 65 -- -- --   20 0700 97.6 °F (36.4 °C) 68 16 103/45 97 %   20 0245 97.6 °F (36.4 °C) (!) 58 14 115/60 98 %   20 2355 97.6 °F (36.4 °C) 66 14 154/83 98 %   20 2300 97.8 °F (36.6 °C) 67 14 131/73 95 %   200 -- 71 15 136/72 93 %   200 -- 71 11 149/77 97 %   200 -- 76 17 (!) 158/94 98 %   20 2100 -- 69 15 129/75 97 %   20 1634 97.4 °F (36.3 °C) 92 20 (!) 207/89 99 %       Intake/Output Summary (Last 24 hours) at 1/28/2020 1012  Last data filed at 1/27/2020 2355  Gross per 24 hour   Intake 0 ml   Output --   Net 0 ml        PHYSICAL EXAM:  General: WD, WN. Alert, cooperative, no acute distress    EENT:  EOMI. Anicteric sclerae. MMM  Resp:  CTA bilaterally, no wheezing or rales. No accessory muscle use  CV:  Regular  rhythm,  No edema  GI:  Soft, Non distended, Non tender.  +Bowel sounds  Neurologic:  Alert and oriented X 3, normal speech,   Psych:   Good insight. Not anxious nor agitated  Skin:  No rashes. No jaundice    Reviewed most current lab test results and cultures  YES  Reviewed most current radiology test results   YES  Review and summation of old records today    NO  Reviewed patient's current orders and MAR    YES  PMH/SH reviewed - no change compared to H&P  ________________________________________________________________________  Care Plan discussed with:    Comments   Patient     Family      RN     Care Manager     Consultant                        Multidiciplinary team rounds were held today with , nursing, pharmacist and clinical coordinator. Patient's plan of care was discussed; medications were reviewed and discharge planning was addressed. ________________________________________________________________________  Total NON critical care TIME:  20   Minutes    Total CRITICAL CARE TIME Spent:   Minutes non procedure based      Comments   >50% of visit spent in counseling and coordination of care     ________________________________________________________________________  Rachel Garcia DO     Procedures: see electronic medical records for all procedures/Xrays and details which were not copied into this note but were reviewed prior to creation of Plan. LABS:  I reviewed today's most current labs and imaging studies.   Pertinent labs include:  Recent Labs     01/27/20  1717   WBC 7.7   HGB 15.6 HCT 47.5        Recent Labs     01/27/20  1717      K 3.8      CO2 28   *   BUN 10   CREA 1.11   CA 9.3   ALB 4.1   TBILI 0.6   SGOT 21   ALT 31   INR 1.0       Signed: Yolis Scott,

## 2020-01-28 NOTE — PROGRESS NOTES
Problem: TIA/CVA Stroke: 0-24 hours  Goal: Activity/Safety  Outcome: Progressing Towards Goal  Goal: Respiratory  Outcome: Progressing Towards Goal  Goal: *Hemodynamically stable  Outcome: Progressing Towards Goal  Goal: *Neurologically stable  Description  Absence of additional neurological deficits    Outcome: Progressing Towards Goal     Problem: TIA/CVA Stroke: Day 2 Until Discharge  Goal: Activity/Safety  Outcome: Progressing Towards Goal  Goal: Nutrition/Diet  Outcome: Progressing Towards Goal  Goal: *Absence of aspiration  Outcome: Progressing Towards Goal  Goal: *Optimal pain control at patient's stated goal  Outcome: Progressing Towards Goal  Goal: *Tolerating diet  Outcome: Progressing Towards Goal     Problem: Falls - Risk of  Goal: *Absence of Falls  Description  Document Fab Johnson Fall Risk and appropriate interventions in the flowsheet.   Outcome: Progressing Towards Goal  Note: Fall Risk Interventions:            Medication Interventions: Teach patient to arise slowly                   Problem: Pain  Goal: *Control of Pain  Outcome: Progressing Towards Goal

## 2020-01-28 NOTE — PROGRESS NOTES
PHYSICAL THERAPY EVALUATION/DISCHARGE  Patient: Jaky Long (3 y.o. male)  Date: 1/28/2020  Primary Diagnosis: CVA (cerebral vascular accident) [facility]) [I63.9]       Precautions:          ASSESSMENT  Based on the objective data described below, the patient presents with near baseline level of function. Patient currently not reporting any deficits with vision and states he does not have any strength deficits. He is able to come to EOB, stand and ambulate with SBA. No overt LOB or difficulty ambulation noted. Patient is safe to DC home with no PT follow up needed at this time. Please reconsult should patient status change. Other factors to consider for discharge: none     Further skilled acute physical therapy is not indicated at this time. PLAN :  Recommendation for discharge: (in order for the patient to meet his/her long term goals)  No skilled physical therapy/ follow up rehabilitation needs identified at this time. IF patient discharges home will need the following DME: none       SUBJECTIVE:   Patient stated I feel fine now.   But when it happens it's only my eyes    OBJECTIVE DATA SUMMARY:   HISTORY:    Past Medical History:   Diagnosis Date    Hypertension      Past Surgical History:   Procedure Laterality Date    HX CHOLECYSTECTOMY         Prior level of function: Independent with functional mobility at baseline  Personal factors and/or comorbidities impacting plan of care:     Home Situation  Home Environment: Private residence  # Steps to Enter: 1  One/Two Story Residence: One story  Living Alone: No  Support Systems: Child(nikki)  Patient Expects to be Discharged to[de-identified] Private residence  Current DME Used/Available at Home: None    EXAMINATION/PRESENTATION/DECISION MAKING:   Critical Behavior:  Neurologic State: Alert  Orientation Level: Oriented X4  Cognition: Follows commands, Appropriate safety awareness, Appropriate for age attention/concentration, Appropriate decision making Hearing: Auditory  Auditory Impairment: None    Range Of Motion:  AROM: Generally decreased, functional                       Strength:    Strength: Generally decreased, functional           Functional Mobility:  Bed Mobility:  Rolling: Modified independent  Supine to Sit: Modified independent  Sit to Supine: Modified independent  Scooting: Modified independent  Transfers:  Sit to Stand: Stand-by assistance  Stand to Sit: Stand-by assistance                       Balance:   Sitting: Intact  Standing: Intact  Ambulation/Gait Training:  Distance (ft): 150 Feet (ft)     Ambulation - Level of Assistance: Stand-by assistance     Gait Description (WDL): Exceptions to WDL  Gait Abnormalities: Decreased step clearance              Speed/Virgen: Slow  Step Length: Left shortened;Right shortened      Functional Measure:  Syed Balance Test:    56/56 on SYED (see flow sheet)      Pain Rating:  No c/o pain    Activity Tolerance:   Good  Please refer to the flowsheet for vital signs taken during this treatment. After treatment patient left in no apparent distress:   Sitting in chair, Call bell within reach and Caregiver / family present    COMMUNICATION/EDUCATION:   The patients plan of care was discussed with: Physical Therapist, Occupational Therapist and Registered Nurse. Fall prevention education was provided and the patient/caregiver indicated understanding., Patient/family have participated as able in goal setting and plan of care. and Patient/family agree to work toward stated goals and plan of care.     Thank you for this referral.  Ursula Oliveira, PT, DPT   Time Calculation: 16 mins

## 2020-01-28 NOTE — H&P
HISTORY AND PHYSICAL  Lori Montenegro MD        PCP: None    Please note that this dictation was completed with Postling, the computer voice recognition software. Quite often unanticipated grammatical, syntax, homophones, and other interpretive errors are inadvertently transcribed by the computer software. Please disregard these errors. Please excuse any errors that have escaped final proofreading. Chief Complaint:   Left eye visual disturbance         History of present illness:   Patient is a 78-year-old male medical history significant for HTN, hyperlipidemia and tobacco use who presents to the emergency department with chief complaint of vision loss. Patient reports about 20 episodes of left eye visual disturbances and diplopia since 12:00 PM yesterday , lasting for few seconds. he was seen by ophthalmologist today, no acute problem identified , possible TIAs and amaurosis fugax considered and he was advised to visit the ED for stroke work-up . Patient denies any headache, seech difficulties, auditory change, facial or extremity numbness or weakness, dizziness or vertigo, seizures or any other neurologic symptoms. In the emergency department, V/S were unremarkable. CT stroke protocol revealed Significant right carotid bifurcation disease. Neuro interventional surgery consulted, recommend ASA and Plavix.          PMH/PSH:  Past Medical History:   Diagnosis Date    Hypertension      Past Surgical History:   Procedure Laterality Date    HX CHOLECYSTECTOMY         Home meds:   Prior to Admission medications    Medication Sig Start Date End Date Taking? Authorizing Provider   aspirin delayed-release 81 mg tablet Take 81 Tabs by mouth daily. 3/31/08  Yes Provider, Historical   atorvastatin (LIPITOR) 20 mg tablet Take 20 mg by mouth nightly. 12/5/19  Yes Provider, Historical   losartan-hydroCHLOROthiazide (HYZAAR) 100-25 mg per tablet Take 1 Tab by mouth daily.  11/30/19  Yes Provider, Historical cholecalciferol (VITAMIN D3) 25 mcg (1,000 unit) cap Take 2,000 Units by mouth daily. 11/30/19  Yes Provider, Historical   albuterol (PROVENTIL HFA, VENTOLIN HFA, PROAIR HFA) 90 mcg/actuation inhaler Take 2 Puffs by inhalation every six (6) hours as needed for Wheezing. Yes Provider, Historical   fluticasone propionate (FLONASE ALLERGY RELIEF) 50 mcg/actuation nasal spray 2 Sprays by Both Nostrils route daily. Yes Provider, Historical   therapeutic multivitamin (THERAGRAN) tablet Take 1 Tab by mouth daily. Yes Provider, Historical       Allergies:  No Known Allergies    FH:  No family history of stroke or heart disease  SH:  Social History     Tobacco Use    Smoking status: Current Every Day Smoker    Smokeless tobacco: Never Used   Substance Use Topics    Alcohol use: Never     Frequency: Never       ROS: A comprehensive review of systems was negative except for that written in the HPI. PHYSICAL EXAM:  Visit Vitals  /72   Pulse 71   Temp 97.4 °F (36.3 °C)   Resp 15   Ht 5' 9.5\" (1.765 m)   Wt 86.2 kg (190 lb)   SpO2 93%   BMI 27.66 kg/m²       General:          Alert, cooperative, no distress, appears stated age. HEENT:           Atraumatic, anicteric sclerae, pink conjunctivae                          No oral ulcers, mucosa moist, throat clear, dentition fair  Neck:               Supple, symmetrical,  thyroid: non tender  Lungs:             Clear to auscultation bilaterally. No Wheezing or Rhonchi. No rales. Chest wall:      No tenderness  No Accessory muscle use. Heart:              Regular  rhythm,  No  murmur   No edema  Abdomen:        Soft, non-tender. Not distended. Bowel sounds normal  Extremities:     No cyanosis. No clubbing,                            Skin turgor normal, Capillary refill normal, Radial dial pulse 2+  Skin:                Not pale. Not Jaundiced  No rashes   Psych:             Not anxious or agitated.   Neurologic:     Alert and oriented to PPT, CNII-XII intact. Motor and sensory exam grossly intact. Labs/Imaging:  Recent Results (from the past 24 hour(s))   EKG, 12 LEAD, INITIAL    Collection Time: 01/27/20  5:15 PM   Result Value Ref Range    Ventricular Rate 77 BPM    Atrial Rate 77 BPM    P-R Interval 346 ms    QRS Duration 98 ms    Q-T Interval 424 ms    QTC Calculation (Bezet) 479 ms    Calculated P Axis -27 degrees    Calculated R Axis -11 degrees    Calculated T Axis 14 degrees    Diagnosis       Sinus rhythm with 1st degree AV block  Inferior infarct , age undetermined  No previous ECGs available  Confirmed by [facility], M.D., [place] ([id number]) on 1/27/2020 5:40:57 PM     CBC WITH AUTOMATED DIFF    Collection Time: 01/27/20  5:17 PM   Result Value Ref Range    WBC 7.7 4.1 - 11.1 K/uL    RBC 5.00 4. 10 - 5.70 M/uL    HGB 15.6 12.1 - 17.0 g/dL    HCT 47.5 36.6 - 50.3 %    MCV 95.0 80.0 - 99.0 FL    MCH 31.2 26.0 - 34.0 PG    MCHC 32.8 30.0 - 36.5 g/dL    RDW 12.2 11.5 - 14.5 %    PLATELET 576 807 - 919 K/uL    MPV 12.4 8.9 - 12.9 FL    NRBC 0.0 0  WBC    ABSOLUTE NRBC 0.00 0.00 - 0.01 K/uL    NEUTROPHILS 53 32 - 75 %    LYMPHOCYTES 29 12 - 49 %    MONOCYTES 8 5 - 13 %    EOSINOPHILS 8 (H) 0 - 7 %    BASOPHILS 2 (H) 0 - 1 %    IMMATURE GRANULOCYTES 0 0.0 - 0.5 %    ABS. NEUTROPHILS 4.1 1.8 - 8.0 K/UL    ABS. LYMPHOCYTES 2.2 0.8 - 3.5 K/UL    ABS. MONOCYTES 0.6 0.0 - 1.0 K/UL    ABS. EOSINOPHILS 0.7 (H) 0.0 - 0.4 K/UL    ABS. BASOPHILS 0.1 0.0 - 0.1 K/UL    ABS. IMM.  GRANS. 0.0 0.00 - 0.04 K/UL    DF AUTOMATED     METABOLIC PANEL, COMPREHENSIVE    Collection Time: 01/27/20  5:17 PM   Result Value Ref Range    Sodium 138 136 - 145 mmol/L    Potassium 3.8 3.5 - 5.1 mmol/L    Chloride 104 97 - 108 mmol/L    CO2 28 21 - 32 mmol/L    Anion gap 6 5 - 15 mmol/L    Glucose 123 (H) 65 - 100 mg/dL    BUN 10 6 - 20 MG/DL    Creatinine 1.11 0.70 - 1.30 MG/DL    BUN/Creatinine ratio 9 (L) 12 - 20      GFR est AA >60 >60 ml/min/1.73m2    GFR est non-AA >60 >60 ml/min/1.73m2    Calcium 9.3 8.5 - 10.1 MG/DL    Bilirubin, total 0.6 0.2 - 1.0 MG/DL    ALT (SGPT) 31 12 - 78 U/L    AST (SGOT) 21 15 - 37 U/L    Alk. phosphatase 79 45 - 117 U/L    Protein, total 7.4 6.4 - 8.2 g/dL    Albumin 4.1 3.5 - 5.0 g/dL    Globulin 3.3 2.0 - 4.0 g/dL    A-G Ratio 1.2 1.1 - 2.2     SAMPLES BEING HELD    Collection Time: 01/27/20  5:17 PM   Result Value Ref Range    SAMPLES BEING HELD  1 RED     COMMENT        Add-on orders for these samples will be processed based on acceptable specimen integrity and analyte stability, which may vary by analyte. PROTHROMBIN TIME + INR    Collection Time: 01/27/20  5:17 PM   Result Value Ref Range    INR 1.0 0.9 - 1.1      Prothrombin time 10.2 9.0 - 11.1 sec       Recent Labs     01/27/20  1717   WBC 7.7   HGB 15.6   HCT 47.5        Recent Labs     01/27/20  1717      K 3.8      CO2 28   BUN 10   CREA 1.11   *   CA 9.3     Recent Labs     01/27/20  1717   SGOT 21   ALT 31   AP 79   TBILI 0.6   TP 7.4   ALB 4.1   GLOB 3.3       No results for input(s): CPK, CKNDX, TROIQ in the last 72 hours. No lab exists for component: CPKMB    Recent Labs     01/27/20  1717   INR 1.0   PTP 10.2        No results for input(s): PH, PCO2, PO2 in the last 72 hours. CTA HEAD  Narrative: EXAM:  CTA HEAD, CTA NECK    INDICATION:   tia    COMPARISON:  None    CONTRAST: 100 mL of Isovue-370Optiray-350. TECHNIQUE:   Unenhanced CT of the head was performed. Following this, multislice helical CT  angiography of the head was performed during uneventful rapid bolus intravenous  administration of contrast. Coronal and sagittal reformations and MIP images and  3D shaded surface display renderings were generated. CT dose reduction was  achieved through use of a standardized protocol tailored for this examination  and automatic exposure control for dose modulation. FINDINGS:  NASCET Criteria .   Head CT obtained after intravenous contrast show no enhancing lesion or masses. There is a 80% + percent stenosis at the origin of the right internal carotid. Distal flow is demonstrated. There is no significant left carotid bifurcation disease. Origins of the great vessels from the arch appear unremarkable. There is no intracranial stenosis or vascular malformation. Impression: IMPRESSION: Significant right carotid bifurcation disease. CTA NECK  Narrative: EXAM:  CTA HEAD, CTA NECK    INDICATION:   tia    COMPARISON:  None    CONTRAST: 100 mL of Isovue-370Optiray-350. TECHNIQUE:   Unenhanced CT of the head was performed. Following this, multislice helical CT  angiography of the head was performed during uneventful rapid bolus intravenous  administration of contrast. Coronal and sagittal reformations and MIP images and  3D shaded surface display renderings were generated. CT dose reduction was  achieved through use of a standardized protocol tailored for this examination  and automatic exposure control for dose modulation. FINDINGS:  NASCET Criteria . Head CT obtained after intravenous contrast show no enhancing lesion or masses. There is a 80% + percent stenosis at the origin of the right internal carotid. Distal flow is demonstrated. There is no significant left carotid bifurcation disease. Origins of the great vessels from the arch appear unremarkable. There is no intracranial stenosis or vascular malformation. Impression: IMPRESSION: Significant right carotid bifurcation disease. CT HEAD WO CONT  Narrative: EXAM: CT HEAD WO CONT    INDICATION: Loss of vision to the left eye    COMPARISON: None. CONTRAST: None. TECHNIQUE: Unenhanced CT of the head was performed using 5 mm images. Brain and  bone windows were generated. CT dose reduction was achieved through use of a  standardized protocol tailored for this examination and automatic exposure  control for dose modulation.       FINDINGS:  There is no extra-axial fluid collection, hemorrhage or shift. No masses. Impression: IMPRESSION: Negative. Assessment & Plan:  ====================  Amaurosis fugax  (Transient left eye visual loss)  Right carotid artery stenosis   CT stroke protocol revealed significant right carotid artery bifurcation disease   Telemetry monitoring   Neuro interventional surgery on board  Received ASA and Plavix  Aspirin test pending  Increase home atorvastatin 20 mg to 40 mg  Neurology consult   PT-OT evaluation   SLP evaluation   Neuro check as per stroke unit    Hypertension  Allow permissive hypertension    Hyperlipidemia  Continue statin    Tobacco use  Counseled for cessation        Patient's Baseline: Ambulates with walking  DVT ppx: SCD  Code status:Full   Disposition: TBD  Care plan discussed with patient/family and nurse.               Signed By: Brendon Enamorado MD     January 27, 2020

## 2020-01-28 NOTE — PROGRESS NOTES
Transition of Care Plan   Home with family   Medical follow up  No PCP       Reason for Admission:    CVA    Medical hx includes Hypertension  No PCP                    RUR Score:   8% low                   Plan for utilizing home health:      Not indicated at this time                        Current Advanced Directive/Advance Care Plan:  Not in chart   Full code                          Transition of Care Plan:        Home with medical follow up          Cm met with patient in his room to introduce self and explain role. Patient was alert and oriented. Patient confirmed demographics, insurance Medicare and [facility] Care and no PCP-- j    Patient and wife, Janeen Nava, moved to [place] to live in an apartment ([place]) about a month ago from [place], [place]. Wife was not well and is currently in ICU and prognosis poor. Patient said he has 3 adult children-- Danilo Jean [phone] has come from [place] to be with patient and wife, and two sons who live locally. All are supportive. Patient was self care and independent prior to admission. He drives and was caring for his wife prior to admission    Patient is retired [employer]--20 years and then worked for [employer] until [employer] 3 years ago. Patient does not have a PCP-- He has the list of [facility] physicians. CM offered to have Care Manager Specialist arrange appointment for him when discharged but he declined. He said he is going to choose his own PCP and make his own appointment. Patient does not utilize the [facility] at all. He does not want any of the services that he may be eligible to use. He secures medications at [facility].      Patient has been evaluated by PT/OT and speech and no home services needed. Family will be available to assist patient when discharged. Patient said he will likely go sit with his wife in ICU.       Care Management Interventions  PCP Verified by CM: Yes(no PCP  and patient wants to arrange his own appointment)  Mode of Transport at Discharge: (car)  Transition of Care Consult (CM Consult): Discharge Planning  Discharge Durable Medical Equipment: No  Physical Therapy Consult: Yes  Occupational Therapy Consult: Yes  Speech Therapy Consult: Yes  Current Support Network: Lives with Spouse(lives with wife in apartment-- self care and independent prior to admission.   No AMD in chart   )  Confirm Follow Up Transport: Self(family)  Discharge Location  Discharge Placement: Home

## 2020-01-28 NOTE — PROGRESS NOTES
Occupational Therapy    Occupational therapy evaluation completed. Full documentation to follow. Recommend d/c home when medically stable with no additional OT needs.     Saima Coreas, OTR/L

## 2020-01-28 NOTE — PROGRESS NOTES
Neurointerventional Surgery Progress Note  Camilo Perez NP  Cell:           Admit Date: 1/27/2020        Daily Progress Note: 1/28/2020   LOS: 1 day          Interval History/Subjective:     No acute events overnight. Patients P2Y12 is barely therapeutic at 194. No further symptoms overnight. Patient complaining of 4/10 headache at the base of his neck and some positional dizziness which is chronic. Admits to being under significant stress this week with his wife in our ICU in critical condition. Plan for today:   Start IV fluids, /hr   Albuterol for expiratory wheezing audible on exam  NPO for possible right carotid stenting   Carotid Ultrasound first to evaluate velocities   Additional 150 mg Plavix this AM with repeat P2Y12 at 11 Am       Assessment & Plan: Active Problems:    CVA (cerebral vascular accident) ([address] (1/27/2020)    1.) Amaurosis fugax. Transient monocular visual loss. Differentials include: Carotid artery disease, giant cell arteritis, cardioembolic disease.               - Seen by Ophthalmologist Dr. Mi Schneider earlier today who did not see any acute occular findings and suggested possible TIAs. -TIA workup per protocol.               -Neurology consulted               - MRI ordered not yet completed      2. ) Carotid stenosis. -      CTA H & N shows 80% + stenosis noted at the origin of the right internal carotid artery   -      Continue aspirin 325 mg daily and give 150 mg Plavix this AM due to  and Aspirin 395   - repeat PRU at 11 AM   - Carotid Ultrasound this AM   - continue with NPO for possible stenting this afternoon               Plan d/w Dr. Carley Beckman, Patient and his daughter Julian Ferrera      Admission Summary:     Aisha Buitrago is a 78 y.o. male with a past medical history of HTN. He came to the ER [facility] at the recommendation of his Ophthalmologist due to multiple episodes of left eye vision loss beginning yesterday 01/26 at noon.  States he will just suddenly and completely lose vision for a few seconds in the left eye. Says this has happened approximately 15-20 times. States he blinks and his vision comes back. Earlier today he saw the Ophthalmologist Dr. Ashely Dalal who did not find any occular issues and suggested that he may be having TIAs and amaurosis fugax. On arrival to the ER he had CTH and CTA head and neck. His CTH was negative for acute findings. CTA of the head and neck revealed 80%+ stenosis at the origin of the right internal carotid artery with no significant left carotid artery disease. No intracranial stenosis or vascular malformation noted. Of note, his wife is in critical condition in our ICU. Denies any flashing lights, headaches, weakness, difficulty speaking or swallowing. Denies numbness, tingling, chest pain, palpitations, leg pain, nausea, vomiting, difficulty swallowing, balance issues or dyspnea.     Current Facility-Administered Medications   Medication Dose Route Frequency Provider Last Rate Last Dose    hydroCHLOROthiazide (HYDRODIURIL) tablet 25 mg  25 mg Oral DAILY Gianluca Jennings MD   25 mg at 01/28/20 0852    clopidogreL (PLAVIX) tablet 75 mg  75 mg Oral DAILY Colt Rivers NP   75 mg at 01/28/20 3672    aspirin tablet 325 mg  325 mg Oral DAILY Colt Rivers NP   325 mg at 01/28/20 4845    atorvastatin (LIPITOR) tablet 80 mg  80 mg Oral QHS Himanshu Brooke MD        0.9% sodium chloride infusion  100 mL/hr IntraVENous CONTINUOUS Porshavanessa ROMERO,  mL/hr at 01/28/20 1030 100 mL/hr at 01/28/20 1030    cholecalciferol (VITAMIN D3) (1000 Units /25 mcg) tablet 2 Tab  2,000 Units Oral DAILY Gianluca Jennings MD   2 Tab at 01/28/20 0851    fluticasone propionate (FLONASE) 50 mcg/actuation nasal spray 2 Spray  2 Spray Both Nostrils DAILY Gianluca Jennings MD        losartan (COZAAR) tablet 100 mg  100 mg Oral DAILY Gianluca Jennings MD   100 mg at 01/28/20 0851    therapeutic multivitamin [facility]) tablet 1 Tab  1 Tab Oral DAILY Edilson Jennings MD   1 Tab at 20 0851    acetaminophen (TYLENOL) tablet 650 mg  650 mg Oral Q4H PRN Edilson Jennings MD        Or    acetaminophen (TYLENOL) solution 650 mg  650 mg Per NG tube Q4H PRN Edilson Jennings MD        Or    acetaminophen (TYLENOL) suppository 650 mg  650 mg Rectal Q4H PRN Edilson Jennings MD        ondansetron (ZOFRAN) injection 4 mg  4 mg IntraVENous Q6H PRN Edilson Jennings MD            No Known Allergies    Review of Systems:  A comprehensive review of systems was negative except for that written in the History of Present Illness. Objective:     Vital signs  Temp (24hrs), Av.7 °F (36.5 °C), Min:97.4 °F (36.3 °C), Max:98 °F (36.7 °C)   No intake/output data recorded. No intake/output data recorded. Visit Vitals  /73   Pulse 66   Temp 98 °F (36.7 °C)   Resp 14   Ht 5' 9.5\" (1.765 m)   Wt 89.7 kg (197 lb 12 oz)   SpO2 97%   BMI 28.78 kg/m²      O2 Device: Room air   Vitals:    20 0700 20 0800 20 0851 20 1005   BP: 103/45  121/78 128/73   Pulse: 68 65 70 66   Resp: 16   14   Temp: 97.6 °F (36.4 °C)   98 °F (36.7 °C)   SpO2: 97%      Weight:       Height:            Physical Exam:  GENERAL: alert, cooperative, no distress, appears stated age  LUNG: wheezes throughout bilateral posterior lobes   HEART: regular rate and rhythm, S1, S2 normal, no murmur, click, rub or gallop  ABDOMEN: soft, non-tender. Bowel sounds normal. No masses,  no organomegaly  EXTREMITIES:  extremities normal, atraumatic, no cyanosis or edema    Neurologic Exam:  Mental Status:  Alert and oriented x 4. Appropriate affect, mood and behavior. Language:    Normal fluency, repetition, comprehension and naming. Cranial Nerves:          Pupils equal, round and reactive to light. Visual fields full to confrontation. Extraocular movements intact. Facial sensation intact.      Full facial strength, no asymmetry. Hearing intact bilaterally. No dysarthria. Tongue protrudes to midline, palate elevates symmetrically. Shoulder shrug 5/5 bilaterally. Motor:    No pronator drift. Bulk and tone normal.      5/5 power in all extremities proximally and distally. No involuntary movements. Sensation:    Sensation intact throughout to light touch      Coordination & Gait: Normal. FTN and HTS intact with no ataxia present. Gait deferred    NIHSS:      1a-LOC:0    1b-Month/Age:0    1c-Open/Close Hand:0    2-Best Gaze:0    3-Visual Fields:0    4-Facial Palsy:0    5a-Left Arm:0    5b-Right Arm:0    6a-Left Le    6b-Right Le    7-Limb Ataxia:0    8-Sensory:0    9-Best Language:0    10-Dysarthria:0    11-Extinction/Inattention:0  TOTAL SCORE:0         Imaging:    CT Results (maximum last 3): Results from [place] encounter on 20   CTA HEAD     Narrative EXAM:  CTA HEAD, CTA NECK     INDICATION:   tia     COMPARISON:  None     CONTRAST: 100 mL of Isovue-370Optiray-350.     TECHNIQUE:   Unenhanced CT of the head was performed. Following this, multislice helical CT  angiography of the head was performed during uneventful rapid bolus intravenous  administration of contrast. Coronal and sagittal reformations and MIP images and  3D shaded surface display renderings were generated. CT dose reduction was  achieved through use of a standardized protocol tailored for this examination  and automatic exposure control for dose modulation.      FINDINGS:  NASCET Criteria . Head CT obtained after intravenous contrast show no enhancing lesion or masses.     There is a 80% + percent stenosis at the origin of the right internal carotid. Distal flow is demonstrated. There is no significant left carotid bifurcation disease. Origins of the great vessels from the arch appear unremarkable.   There is no intracranial stenosis or vascular malformation.        Impression IMPRESSION: Significant right carotid bifurcation disease.      CTA NECK     Narrative EXAM:  CTA HEAD, CTA NECK     INDICATION:   tia     COMPARISON:  None     CONTRAST: 100 mL of Isovue-370Optiray-350.     TECHNIQUE:   Unenhanced CT of the head was performed. Following this, multislice helical CT  angiography of the head was performed during uneventful rapid bolus intravenous  administration of contrast. Coronal and sagittal reformations and MIP images and  3D shaded surface display renderings were generated. CT dose reduction was  achieved through use of a standardized protocol tailored for this examination  and automatic exposure control for dose modulation.      FINDINGS:  NASCET Criteria . Head CT obtained after intravenous contrast show no enhancing lesion or masses.     There is a 80% + percent stenosis at the origin of the right internal carotid. Distal flow is demonstrated. There is no significant left carotid bifurcation disease. Origins of the great vessels from the arch appear unremarkable. There is no intracranial stenosis or vascular malformation.        Impression IMPRESSION: Significant right carotid bifurcation disease.      CT HEAD WO CONT     Narrative EXAM: CT HEAD WO CONT     INDICATION: Loss of vision to the left eye     COMPARISON: None.     CONTRAST: None.     TECHNIQUE: Unenhanced CT of the head was performed using 5 mm images. Brain and  bone windows were generated. CT dose reduction was achieved through use of a  standardized protocol tailored for this examination and automatic exposure  control for dose modulation.       FINDINGS:  There is no extra-axial fluid collection, hemorrhage or shift.  No masses.        Impression IMPRESSION: Negative.          24 hour results:    Recent Results (from the past 24 hour(s))   EKG, 12 LEAD, INITIAL    Collection Time: 01/27/20  5:15 PM   Result Value Ref Range    Ventricular Rate 77 BPM    Atrial Rate 77 BPM    P-R Interval 346 ms    QRS Duration 98 ms    Q-T Interval 424 ms    QTC Calculation (Bezet) 479 ms    Calculated P Axis -27 degrees    Calculated R Axis -11 degrees    Calculated T Axis 14 degrees    Diagnosis       Sinus rhythm with 1st degree AV block  Inferior infarct , age undetermined  No previous ECGs available  Confirmed by Sadi Espinoza M.D., Lander Sera ([id number]) on 1/27/2020 5:40:57 PM     CBC WITH AUTOMATED DIFF    Collection Time: 01/27/20  5:17 PM   Result Value Ref Range    WBC 7.7 4.1 - 11.1 K/uL    RBC 5.00 4. 10 - 5.70 M/uL    HGB 15.6 12.1 - 17.0 g/dL    HCT 47.5 36.6 - 50.3 %    MCV 95.0 80.0 - 99.0 FL    MCH 31.2 26.0 - 34.0 PG    MCHC 32.8 30.0 - 36.5 g/dL    RDW 12.2 11.5 - 14.5 %    PLATELET 491 802 - 320 K/uL    MPV 12.4 8.9 - 12.9 FL    NRBC 0.0 0  WBC    ABSOLUTE NRBC 0.00 0.00 - 0.01 K/uL    NEUTROPHILS 53 32 - 75 %    LYMPHOCYTES 29 12 - 49 %    MONOCYTES 8 5 - 13 %    EOSINOPHILS 8 (H) 0 - 7 %    BASOPHILS 2 (H) 0 - 1 %    IMMATURE GRANULOCYTES 0 0.0 - 0.5 %    ABS. NEUTROPHILS 4.1 1.8 - 8.0 K/UL    ABS. LYMPHOCYTES 2.2 0.8 - 3.5 K/UL    ABS. MONOCYTES 0.6 0.0 - 1.0 K/UL    ABS. EOSINOPHILS 0.7 (H) 0.0 - 0.4 K/UL    ABS. BASOPHILS 0.1 0.0 - 0.1 K/UL    ABS. IMM. GRANS. 0.0 0.00 - 0.04 K/UL    DF AUTOMATED     METABOLIC PANEL, COMPREHENSIVE    Collection Time: 01/27/20  5:17 PM   Result Value Ref Range    Sodium 138 136 - 145 mmol/L    Potassium 3.8 3.5 - 5.1 mmol/L    Chloride 104 97 - 108 mmol/L    CO2 28 21 - 32 mmol/L    Anion gap 6 5 - 15 mmol/L    Glucose 123 (H) 65 - 100 mg/dL    BUN 10 6 - 20 MG/DL    Creatinine 1.11 0.70 - 1.30 MG/DL    BUN/Creatinine ratio 9 (L) 12 - 20      GFR est AA >60 >60 ml/min/1.73m2    GFR est non-AA >60 >60 ml/min/1.73m2    Calcium 9.3 8.5 - 10.1 MG/DL    Bilirubin, total 0.6 0.2 - 1.0 MG/DL    ALT (SGPT) 31 12 - 78 U/L    AST (SGOT) 21 15 - 37 U/L    Alk.  phosphatase 79 45 - 117 U/L    Protein, total 7.4 6.4 - 8.2 g/dL    Albumin 4.1 3.5 - 5.0 g/dL    Globulin 3.3 2.0 - 4.0 g/dL    A-G Ratio 1.2 1.1 - 2.2 SAMPLES BEING HELD    Collection Time: 01/27/20  5:17 PM   Result Value Ref Range    SAMPLES BEING HELD  1 RED     COMMENT        Add-on orders for these samples will be processed based on acceptable specimen integrity and analyte stability, which may vary by analyte.    PROTHROMBIN TIME + INR    Collection Time: 01/27/20  5:17 PM   Result Value Ref Range    INR 1.0 0.9 - 1.1      Prothrombin time 10.2 9.0 - 11.1 sec   ASPIRIN TEST    Collection Time: 01/28/20  2:45 AM   Result Value Ref Range    Aspirin test 395 ARU   PLATELET FUNCTION, VERIFY NOW P2Y12    Collection Time: 01/28/20  2:45 AM   Result Value Ref Range    P2Y12 Plt response 194 194 - 418 PRU   LIPID PANEL    Collection Time: 01/28/20  2:45 AM   Result Value Ref Range    LIPID PROFILE          Cholesterol, total 184 <200 MG/DL    Triglyceride 182 (H) <150 MG/DL    HDL Cholesterol 45 MG/DL    LDL, calculated 102.6 (H) 0 - 100 MG/DL    VLDL, calculated 36.4 MG/DL    CHOL/HDL Ratio 4.1 0.0 - 5.0     HEMOGLOBIN A1C WITH EAG    Collection Time: 01/28/20  2:45 AM   Result Value Ref Range    Hemoglobin A1c 5.6 4.0 - 5.6 %    Est. average glucose 114 mg/dL        Louis Delcid NP

## 2020-01-28 NOTE — ED PROVIDER NOTES
78 y.o. male with past medical history significant for HTN who presents from home via personal vehicle with chief complaint of vision loss. Patient reports multiple episodes of visual disturbances starting at 12:00 PM yesterday. Patient states his visual disturbance and vision loss localizes to his left eye. Patient states he has had 15-20 episodes. Patient reports these episodes will only last a few seconds, as his symptoms dissipate once he blinks. While these episodes occur, the patient experiences diplopia. Patient today went to his opthamologist (Dr. Zee Galvan) who did not find any acute issues related to his eyes, believing the patient could be having TIAs and amaurosis fugax. Patient notes he takes 81 mg Aspirin. Patient denies headache, speech difficulty, weakness, and confusion. There are no other acute medical concerns at this time. Social hx: Current smoker  PCP: None    Note written by Sai Ojeda, as dictated by Amanda Schaefer MD 8:24 PM        The history is provided by the patient. No  was used. Past Medical History:   Diagnosis Date    Hypertension        Past Surgical History:   Procedure Laterality Date    HX CHOLECYSTECTOMY           History reviewed. No pertinent family history.     Social History     Socioeconomic History    Marital status: SINGLE     Spouse name: Not on file    Number of children: Not on file    Years of education: Not on file    Highest education level: Not on file   Occupational History    Not on file   Social Needs    Financial resource strain: Not on file    Food insecurity:     Worry: Not on file     Inability: Not on file    Transportation needs:     Medical: Not on file     Non-medical: Not on file   Tobacco Use    Smoking status: Current Every Day Smoker    Smokeless tobacco: Never Used   Substance and Sexual Activity    Alcohol use: Never     Frequency: Never    Drug use: Never    Sexual activity: Not on file Lifestyle    Physical activity:     Days per week: Not on file     Minutes per session: Not on file    Stress: Not on file   Relationships    Social connections:     Talks on phone: Not on file     Gets together: Not on file     Attends [employer] service: Not on file     Active member of club or organization: Not on file     Attends meetings of clubs or organizations: Not on file     Relationship status: Not on file    Intimate partner violence:     Fear of current or ex partner: Not on file     Emotionally abused: Not on file     Physically abused: Not on file     Forced sexual activity: Not on file   Other Topics Concern    Not on file   Social History Narrative    Not on file         ALLERGIES: Patient has no known allergies. Review of Systems   Constitutional: Negative for fever. Eyes: Positive for visual disturbance. Respiratory: Negative for cough, shortness of breath and wheezing. Cardiovascular: Negative for chest pain and leg swelling. Gastrointestinal: Negative for abdominal pain, diarrhea, nausea and vomiting. Genitourinary: Negative for dysuria. Musculoskeletal: Negative. Negative for back pain and neck stiffness. Skin: Negative for rash. Neurological: Negative. Negative for syncope and headaches. Psychiatric/Behavioral: Negative for confusion. All other systems reviewed and are negative. Vitals:    01/27/20 1634   BP: (!) 207/89   Pulse: 92   Resp: 20   Temp: 97.4 °F (36.3 °C)   SpO2: 99%   Weight: 86.2 kg (190 lb)   Height: 5' 9.5\" (1.765 m)            Physical Exam  Vitals signs and nursing note reviewed. Constitutional:       General: He is not in acute distress. Appearance: He is well-developed. HENT:      Head: Normocephalic. Eyes:      Pupils: Pupils are equal, round, and reactive to light. Neck:      Musculoskeletal: Normal range of motion. Cardiovascular:      Rate and Rhythm: Normal rate and regular rhythm. Heart sounds: No murmur. Pulmonary:      Effort: Pulmonary effort is normal. No respiratory distress. Breath sounds: Normal breath sounds. Abdominal:      Palpations: Abdomen is soft. Tenderness: There is no abdominal tenderness. Musculoskeletal: Normal range of motion. Skin:     General: Skin is warm and dry. Neurological:      Mental Status: He is alert. Cranial Nerves: Cranial nerves are intact. No cranial nerve deficit. Comments: Normal finger-to-nose  No pronator drift  Normal gait   Psychiatric:         Behavior: Behavior normal.      Note written by Sai Weiss, as dictated by Ortiz Lai MD 8:24 PM       MDM       Procedures    ED EKG interpretation: 17;15  Rhythm: normal sinus rhythm; and regular . Rate (approx.): 77; Axis: normal; ST/T wave: No acute ST changes; Old inferior MI  Note written by Sai Weiss, as dictated by Ortiz Lai MD 8:24 PM     CONSULT NOTE:  9:39 PM Ortiz Lai MD spoke with Dr. Jaspal Cormier for Neuro-interventional Radiology. Discussed available diagnostic tests and clinical findings. Dr. Flory Morales recommends another 325 mg Aspirin and 350 mg Plavix. Neuro-interventional NP will be sent down. Spoke c hospitalist, dr. Elise Lefort. He will eval for adm.

## 2020-01-29 ENCOUNTER — APPOINTMENT (OUTPATIENT)
Dept: NON INVASIVE DIAGNOSTICS | Age: 70
DRG: 068 | End: 2020-01-29
Attending: PSYCHIATRY & NEUROLOGY
Payer: MEDICARE

## 2020-01-29 VITALS
OXYGEN SATURATION: 96 % | SYSTOLIC BLOOD PRESSURE: 128 MMHG | WEIGHT: 196 LBS | BODY MASS INDEX: 29.03 KG/M2 | TEMPERATURE: 97.7 F | HEIGHT: 69 IN | RESPIRATION RATE: 17 BRPM | DIASTOLIC BLOOD PRESSURE: 66 MMHG | HEART RATE: 83 BPM

## 2020-01-29 PROBLEM — G45.3 AMAUROSIS FUGAX: Status: ACTIVE | Noted: 2020-01-29

## 2020-01-29 PROBLEM — I65.29 INTERNAL CAROTID ARTERY STENOSIS: Status: ACTIVE | Noted: 2020-01-29

## 2020-01-29 LAB
AV VELOCITY RATIO: 0.63
ECHO AO ROOT DIAM: 3.79 CM
ECHO AV AREA PEAK VELOCITY: 2.3 CM2
ECHO AV PEAK GRADIENT: 5.7 MMHG
ECHO AV PEAK VELOCITY: 119.59 CM/S
ECHO LA AREA 4C: 13 CM2
ECHO LA MAJOR AXIS: 3.65 CM
ECHO LA TO AORTIC ROOT RATIO: 0.96
ECHO LA VOL 2C: 35.91 ML (ref 18–58)
ECHO LA VOL 4C: 26.82 ML (ref 18–58)
ECHO LA VOL BP: 32.64 ML (ref 18–58)
ECHO LA VOL/BSA BIPLANE: 15.94 ML/M2 (ref 16–28)
ECHO LA VOLUME INDEX A2C: 17.53 ML/M2 (ref 16–28)
ECHO LA VOLUME INDEX A4C: 13.1 ML/M2 (ref 16–28)
ECHO LV E' LATERAL VELOCITY: 5.94 CM/S
ECHO LV E' SEPTAL VELOCITY: 3.91 CM/S
ECHO LV EDV A2C: 82.2 ML
ECHO LV EDV A4C: 79.1 ML
ECHO LV EDV BP: 81.8 ML (ref 67–155)
ECHO LV EDV INDEX A4C: 38.6 ML/M2
ECHO LV EDV INDEX BP: 39.9 ML/M2
ECHO LV EDV NDEX A2C: 40.1 ML/M2
ECHO LV EJECTION FRACTION A2C: 42 %
ECHO LV EJECTION FRACTION A4C: 50 %
ECHO LV EJECTION FRACTION BIPLANE: 43.7 % (ref 55–100)
ECHO LV ESV A2C: 47.8 ML
ECHO LV ESV A4C: 39.2 ML
ECHO LV ESV BP: 46 ML (ref 22–58)
ECHO LV ESV INDEX A2C: 23.3 ML/M2
ECHO LV ESV INDEX A4C: 19.1 ML/M2
ECHO LV ESV INDEX BP: 22.5 ML/M2
ECHO LV INTERNAL DIMENSION DIASTOLIC: 5.63 CM (ref 4.2–5.9)
ECHO LV INTERNAL DIMENSION SYSTOLIC: 4.28 CM
ECHO LV IVSD: 0.99 CM (ref 0.6–1)
ECHO LV MASS 2D: 291.8 G (ref 88–224)
ECHO LV MASS INDEX 2D: 142.5 G/M2 (ref 49–115)
ECHO LV POSTERIOR WALL DIASTOLIC: 1.16 CM (ref 0.6–1)
ECHO LVOT DIAM: 2.16 CM
ECHO LVOT PEAK GRADIENT: 2.3 MMHG
ECHO LVOT PEAK VELOCITY: 75.72 CM/S
ECHO MV A VELOCITY: 103.79 CM/S
ECHO MV AREA PHT: 3.8 CM2
ECHO MV E DECELERATION TIME (DT): 200.6 MS
ECHO MV E VELOCITY: 51.41 CM/S
ECHO MV E/A RATIO: 0.5
ECHO MV E/E' LATERAL: 8.65
ECHO MV E/E' RATIO (AVERAGED): 10.9
ECHO MV E/E' SEPTAL: 13.15
ECHO MV PRESSURE HALF TIME (PHT): 58.2 MS
ECHO PV MAX VELOCITY: 116.48 CM/S
ECHO PV PEAK GRADIENT: 5.4 MMHG
ECHO RV INTERNAL DIMENSION: 3.01 CM
ECHO RV TAPSE: 2.11 CM (ref 1.5–2)
LEFT CCA DIST DIAS: 18.9 CM/S
LEFT CCA DIST SYS: 95.6 CM/S
LEFT CCA PROX DIAS: 17.6 CM/S
LEFT CCA PROX SYS: 85.1 CM/S
LEFT ECA DIAS: 7.01 CM/S
LEFT ECA SYS: 82.4 CM/S
LEFT ICA DIST DIAS: 13 CM/S
LEFT ICA DIST SYS: 46.3 CM/S
LEFT ICA MID DIAS: 16.8 CM/S
LEFT ICA MID SYS: 71.8 CM/S
LEFT ICA PROX DIAS: 13.9 CM/S
LEFT ICA PROX SYS: 44.6 CM/S
LEFT ICA/CCA SYS: 0.75
LEFT VERTEBRAL DIAS: 12.31 CM/S
LEFT VERTEBRAL SYS: 41.5 CM/S
LVFS 2D: 23.99 %
MV DEC SLOPE: 2.56
RIGHT CCA DIST DIAS: 16.4 CM/S
RIGHT CCA DIST SYS: 68.8 CM/S
RIGHT CCA PROX DIAS: 17.9 CM/S
RIGHT CCA PROX SYS: 83.4 CM/S
RIGHT ECA DIAS: 9.04 CM/S
RIGHT ECA SYS: 85.8 CM/S
RIGHT ICA DIST DIAS: 19.6 CM/S
RIGHT ICA DIST SYS: 77.3 CM/S
RIGHT ICA MID DIAS: 16.9 CM/S
RIGHT ICA MID SYS: 91.1 CM/S
RIGHT ICA PROX DIAS: 33.4 CM/S
RIGHT ICA PROX SYS: 128.2 CM/S
RIGHT ICA/CCA SYS: 1.9
RIGHT VERTEBRAL DIAS: 10.61 CM/S
RIGHT VERTEBRAL SYS: 36.6 CM/S

## 2020-01-29 PROCEDURE — 93306 TTE W/DOPPLER COMPLETE: CPT

## 2020-01-29 PROCEDURE — 74011250637 HC RX REV CODE- 250/637: Performed by: STUDENT IN AN ORGANIZED HEALTH CARE EDUCATION/TRAINING PROGRAM

## 2020-01-29 PROCEDURE — 74011250637 HC RX REV CODE- 250/637: Performed by: NURSE PRACTITIONER

## 2020-01-29 RX ORDER — ASPIRIN 325 MG
325 TABLET ORAL DAILY
Qty: 30 TAB | Refills: 0 | Status: SHIPPED | OUTPATIENT
Start: 2020-01-30 | End: 2020-02-29

## 2020-01-29 RX ORDER — CLOPIDOGREL BISULFATE 75 MG/1
75 TABLET ORAL DAILY
Qty: 30 TAB | Refills: 0 | Status: SHIPPED | OUTPATIENT
Start: 2020-01-30 | End: 2020-02-29

## 2020-01-29 RX ORDER — ATORVASTATIN CALCIUM 80 MG/1
80 TABLET, FILM COATED ORAL
Qty: 30 TAB | Refills: 0 | Status: SHIPPED | OUTPATIENT
Start: 2020-01-29 | End: 2020-02-28

## 2020-01-29 RX ADMIN — CLOPIDOGREL BISULFATE 75 MG: 75 TABLET, FILM COATED ORAL at 09:08

## 2020-01-29 RX ADMIN — THERA TABS 1 TABLET: TAB at 09:08

## 2020-01-29 RX ADMIN — FLUTICASONE PROPIONATE 2 SPRAY: 50 SPRAY, METERED NASAL at 09:27

## 2020-01-29 RX ADMIN — MELATONIN 2 TABLET: at 09:08

## 2020-01-29 RX ADMIN — ASPIRIN 325 MG ORAL TABLET 325 MG: 325 PILL ORAL at 09:08

## 2020-01-29 RX ADMIN — LOSARTAN POTASSIUM 100 MG: 50 TABLET, FILM COATED ORAL at 09:09

## 2020-01-29 RX ADMIN — HYDROCHLOROTHIAZIDE 25 MG: 25 TABLET ORAL at 09:09

## 2020-01-29 NOTE — PROGRESS NOTES
Problem: TIA/CVA Stroke: Day 2 Until Discharge  Goal: Activity/Safety  Outcome: Progressing Towards Goal  Goal: Respiratory  Outcome: Progressing Towards Goal  Goal: *Absence of aspiration  Outcome: Progressing Towards Goal  Goal: *Optimal pain control at patient's stated goal  Outcome: Progressing Towards Goal  Goal: *Tolerating diet  Outcome: Progressing Towards Goal     Problem: Pressure Injury - Risk of  Goal: *Prevention of pressure injury  Description  Document Pawel Scale and appropriate interventions in the flowsheet. Outcome: Progressing Towards Goal  Note: Pressure Injury Interventions:                                            Problem: Falls - Risk of  Goal: *Absence of Falls  Description  Document Sharri Duque Fall Risk and appropriate interventions in the flowsheet.   Outcome: Progressing Towards Goal  Note: Fall Risk Interventions:            Medication Interventions: Teach patient to arise slowly

## 2020-01-29 NOTE — PROGRESS NOTES
Neurology Progress Note    Patient ID:  Neo De La Paz  [id number]  10 y.o.  1950    Subjective:      No significant events noted over the past 24 hours. Patient reports a single episode of ice pick like pain in his right eye today which he is never experienced before but otherwise denies recurrent vision loss. Is eager to go home. Current Facility-Administered Medications   Medication Dose Route Frequency    hydroCHLOROthiazide (HYDRODIURIL) tablet 25 mg  25 mg Oral DAILY    clopidogreL (PLAVIX) tablet 75 mg  75 mg Oral DAILY    aspirin tablet 325 mg  325 mg Oral DAILY    atorvastatin (LIPITOR) tablet 80 mg  80 mg Oral QHS    0.9% sodium chloride infusion  100 mL/hr IntraVENous CONTINUOUS    albuterol-ipratropium (DUO-NEB) 2.5 MG-0.5 MG/3 ML  3 mL Nebulization Q4H PRN    cholecalciferol (VITAMIN D3) (1000 Units /25 mcg) tablet 2 Tab  2,000 Units Oral DAILY    fluticasone propionate (FLONASE) 50 mcg/actuation nasal spray 2 Spray  2 Spray Both Nostrils DAILY    losartan (COZAAR) tablet 100 mg  100 mg Oral DAILY    therapeutic multivitamin (THERAGRAN) tablet 1 Tab  1 Tab Oral DAILY    acetaminophen (TYLENOL) tablet 650 mg  650 mg Oral Q4H PRN    Or    acetaminophen (TYLENOL) solution 650 mg  650 mg Per NG tube Q4H PRN    Or    acetaminophen (TYLENOL) suppository 650 mg  650 mg Rectal Q4H PRN    ondansetron (ZOFRAN) injection 4 mg  4 mg IntraVENous Q6H PRN          Objective:     Patient Vitals for the past 8 hrs:   BP Temp Pulse Resp SpO2 Height Weight   01/29/20 1413 128/66 97.7 °F (36.5 °C) 83 17 96 % -- --   01/29/20 1238 135/73 -- -- -- -- 5' 9\" (1.753 m) 88.9 kg (196 lb)   01/29/20 1200 -- -- 79 -- -- -- --   01/29/20 1130 -- -- -- -- 95 % -- --   01/29/20 1014 136/75 98 °F (36.7 °C) 85 19 95 % -- --   01/29/20 0919 135/73 -- -- -- -- 5' 9\" (1.753 m) 88.9 kg (196 lb)   01/29/20 0909 122/66 -- 85 -- -- -- --   01/29/20 0800 -- -- 75 -- -- -- --       No intake/output data recorded.   No intake/output data recorded.     Lab Review   Recent Results (from the past 24 hour(s))   ECHO ADULT COMPLETE    Collection Time: 01/29/20 12:39 PM   Result Value Ref Range    LA Volume 32.64 18 - 58 mL    LV E' Lateral Velocity 5.94 cm/s    LV E' Septal Velocity 3.91 cm/s    Tapse 2.11 (A) 1.5 - 2.0 cm    Ao Root D 3.79 cm    Aortic Valve Systolic Peak Velocity 878.62 cm/s    Aortic Valve Area by Continuity of Peak Velocity 2.3 cm2    AoV PG 5.7 mmHg    LVIDd 5.63 4.2 - 5.9 cm    LVPWd 1.16 (A) 0.6 - 1.0 cm    LVIDs 4.28 cm    IVSd 0.99 0.6 - 1.0 cm    LV ED Vol A2C 82.2 mL    LV ES Vol A4C 39.2 mL    LV ES Vol BP 46.0 22 - 58 mL    LVOT d 2.16 cm    LVOT Peak Velocity 75.72 cm/s    LVOT Peak Gradient 2.3 mmHg    MVA (PHT) 3.8 cm2    MV A Yaya 103.79 cm/s    MV E Yaya 51.41 cm/s    MV E/A 0.50     Left Atrium to Aortic Root Ratio 0.96     RVIDd 3.01 cm    BP EF 43.7 (A) 55 - 100 %    LV Ejection Fraction MOD 4C 50 %    LV Ejection Fraction MOD 2C 42 %    LA Vol 4C 26.82 18 - 58 mL    LA Vol 2C 35.91 18 - 58 mL    LA Area 4C 13.0 cm2    LV Mass .8 (A) 88 - 224 g    LV Mass AL Index 142.5 49 - 115 g/m2    E/E' lateral 8.65     E/E' septal 13.15     E/E' ratio (averaged) 10.90     LV ES Vol A2C 47.8 mL    LVES Vol Index BP 22.5 mL/m2    LV ED Vol A4C 79.1 mL    LVED Vol Index BP 39.9 mL/m2    Mitral Valve E Wave Deceleration Time 200.6 ms    Mitral Valve Pressure Half-time 58.2 ms    Left Atrium Major Axis 3.65 cm    Pulmonic Valve Max Velocity 116.48 cm/s    LV ED Vol BP 81.8 67 - 155 ml    LA Vol Index 15.94 16 - 28 ml/m2    LA Vol Index 17.53 16 - 28 ml/m2    LA Vol Index 13.10 16 - 28 ml/m2    LVED Vol Index A4C 38.6 mL/m2    LVED Vol Index A2C 40.1 mL/m2    LVES Vol Index A4C 19.1 mL/m2    LVES Vol Index A2C 23.3 mL/m2    Left Ventricular Fractional Shortening by 2D [id number] %    Mitral Valve Deceleration Potter [id number]     AV Velocity Ratio 0.63     PV peak gradient 5.4 mmHg       Physical exam:  Pleasant male resting comfortably in bed in no distress watching TV. HEENT appears unremarkable, neck appears supple. Cardiovascular demonstrates constant S1/S2, pulmonary demonstrates regular rate and rhythm. Abdomen is nondistended/nontender. Extremities are warm/dry. Neurologically patient appears alert and oriented. Cranial nerves II through XII appear grossly intact. Motorically patient moves all extremities with at least 5 out of 5 strength sensation appears grossly intact remainder nose exam is deferred. Assessment:     Dago Murdock is a 78year old male who  presented as a referral from his ophthalmologist for 15-20 episodes of transient vision loss in the left eye    Plan:   Left eye amaurosis fugax:  As documented initial consult note, patient's history is a bit atypical and challenging to build a case for repeated episodes of amaurosis fugax in the absence of severe vessel stenosis or atherosclerotic burden ipsilateral to his symptoms or a left ophthalmic artery which was dependent on the right carotid for flow  Given the paucity of additional symptoms which raise concern for alternative etiologies at this moment the best hypothesis is retinal artery/ophthalmic artery spasm secondary to perhaps hypertensive emergency  If symptoms were to recur could consider referral to neurology clinic and/or start a calcium channel blockers as an empiric trial    Right carotid artery stenosis:  Doppler ultrasound demonstrates degree of stenosis less than noted on CTA  Patient is been placed on aspirin and Plavix (325 and 75 mg respectively) per neuro interventional services with plans to follow-up in clinic with them  Patient is also been placed on high intensity statin for an LDL above 100. Pending results of echocardiogram patient appears stable for discharge as discussed with primary team, please feel free to reach out questions concerns.       Signed:  Mercy Baeza MD  1/29/2020  3:24 PM

## 2020-01-29 NOTE — PROGRESS NOTES
Stroke Education documented in Patient Education: YES  Core Measures Documented in [facility]:  Risk Factors: YES  Warning signs of stroke: YES  When to Activate 911: YES  Medication Education for Risk Factors: YES  Smoking cessation if applicable: YES  Written Education Given:  YES    Discharge NIH Completed: YES  Score: 0    BRAINS: YES    Follow Up Appointment Made: YES  Date/Time if applicable:

## 2020-01-29 NOTE — DISCHARGE SUMMARY
Discharge Summary       PATIENT ID: Prieto Doherty  MRN: [id number]   YOB: 1950    DATE OF ADMISSION: 1/27/2020  8:19 PM    DATE OF DISCHARGE: 01/29/2020   PRIMARY CARE PROVIDER: None     DISCHARGING PROVIDER: Steven Diggs MD    To contact this individual call [phone] and ask the  to page. If unavailable ask to be transferred the Adult Hospitalist Department. CONSULTATIONS: IP CONSULT TO HOSPITALIST  IP CONSULT TO NEUROINTERVENTIONAL SURGERY  IP CONSULT TO NEUROLOGY    PROCEDURES/SURGERIES: * No surgery found *    ADMITTING DIAGNOSES & HOSPITAL COURSE:   Amaurosis fugax   R ICA stenosis     Patient is a 76-year-old male medical history significant for HTN, hyperlipidemia and tobacco use who presents to the emergency department with chief complaint of vision loss. Patient reports about 20 episodes of left eye visual disturbances and diplopia since 12:00 PM yesterday , lasting for few seconds. he was seen by ophthalmologist today, no acute problem identified , possible TIAs and amaurosis fugax considered and he was advised to visit the ED for stroke work-up . Patient denies any headache, seech difficulties, auditory change, facial or extremity numbness or weakness, dizziness or vertigo, seizures or any other neurologic symptoms. Admitted, and underwent Carotid doppler which were positive for 50-60% stenosis. MEME consulted and recommend DAPT as outpatient, with outpatient follow up. Neurology was consulted, underwent MRI which was normal. Echo done but read is pending. Stable for DC home.        DISCHARGE DIAGNOSES / PLAN:      Amaurosis fugax  (Transient left eye visual loss)  Right carotid artery stenosis   CT stroke protocol revealed significant right carotid artery bifurcation disease   Telemetry monitoring   Neuro interventional surgery on board --> DAPT  Received ASA and Plavix  Aspirin test less than 550  lipitor increased to 80mg daily  Neurology consulted, no need for follow up unless recurrent symtpoms, may consider o/p Ca channel blocker if having recurrent symptoms   PT-OT evaluation --> Home   SLP evaluation   Neuro check as per stroke unit     Hypertension  stable     Hyperlipidemia  Continue statin     Tobacco use  Counseled for cessation        ADDITIONAL CARE RECOMMENDATIONS:   - Please take all medications as prescribed. Note changes as below. ** add plavix in addition to your aspirin   ** increase your aspirin to 325mg  ** increase your atorvastatin to 80mg daily   - Please make sure to follow up with your primary care physician within 1-2 weeks of discharge for hospital follow up. You should also follow up with Neuro interventional IR for an apt in 1 year. - Please make sure to continue to monitor for: sudden weakness, numbness, facial droop  And return to the ED with any of these signs.   - Please get up slowly from a seated or laying position, avoid falls. - Avoid tobacco, alcohol and other illicit drug use. PENDING TEST RESULTS:   At the time of discharge the following test results are still pending:     FOLLOW UP APPOINTMENTS:    Follow-up Information     Follow up With Specialties Details Why Contact Info    Primary care physician   In 1 week      Krista Oshea MD Endovascular Surgical Neuroradiology On 2/5/2021 follow up in 1 year, for repeat carotid ultrasound  [address] [facility]  [address] [facility] [phone]               DIET: Resume previous diet    ACTIVITY: Activity as tolerated    WOUND CARE: None    EQUIPMENT needed: None      DISCHARGE MEDICATIONS:  Current Discharge Medication List      START taking these medications    Details   clopidogreL (PLAVIX) 75 mg tab Take 1 Tab by mouth daily for 30 days. Qty: 30 Tab, Refills: 0      aspirin (ASPIRIN) 325 mg tablet Take 1 Tab by mouth daily for 30 days.   Qty: 30 Tab, Refills: 0         CONTINUE these medications which have CHANGED    Details   atorvastatin (LIPITOR) 80 mg tablet Take 1 Tab by mouth nightly for 30 days. Qty: 30 Tab, Refills: 0         CONTINUE these medications which have NOT CHANGED    Details   losartan-hydroCHLOROthiazide (HYZAAR) 100-25 mg per tablet Take 1 Tab by mouth daily. cholecalciferol (VITAMIN D3) 25 mcg (1,000 unit) cap Take 2,000 Units by mouth daily. albuterol (PROVENTIL HFA, VENTOLIN HFA, PROAIR HFA) 90 mcg/actuation inhaler Take 2 Puffs by inhalation every six (6) hours as needed for Wheezing. fluticasone propionate (FLONASE ALLERGY RELIEF) 50 mcg/actuation nasal spray 2 Sprays by Both Nostrils route daily. therapeutic multivitamin (THERAGRAN) tablet Take 1 Tab by mouth daily. STOP taking these medications       aspirin delayed-release 81 mg tablet Comments:   Reason for Stopping:                 NOTIFY YOUR PHYSICIAN FOR ANY OF THE FOLLOWING:   Fever over 101 degrees for 24 hours. Chest pain, shortness of breath, fever, chills, nausea, vomiting, diarrhea, change in mentation, falling, weakness, bleeding. Severe pain or pain not relieved by medications. Or, any other signs or symptoms that you may have questions about.     DISPOSITION:  X  Home With:   OT  PT  HH  RN       Long term SNF/Inpatient Rehab    Independent/assisted living    Hospice    Other:       PATIENT CONDITION AT DISCHARGE:     Functional status    Poor     Deconditioned    X Independent      Cognition    X Lucid     Forgetful     Dementia      Catheters/lines (plus indication)    Richardson     PICC     PEG    X None      Code status    X Full code     DNR      PHYSICAL EXAMINATION AT DISCHARGE:  Visit Vitals  /66 (BP 1 Location: Right arm, BP Patient Position: At rest)   Pulse 83   Temp 97.7 °F (36.5 °C)   Resp 17   Ht 5' 9\" (1.753 m)   Wt 88.9 kg (196 lb)   SpO2 96%   BMI 28.94 kg/m²     Gen: NAD, awake in bed  HEENT: NC/AT, sclera anicteric, PERRL, EOMI  CV: RRR no m/r/g  Resp: CTA b/l no increased work of breathing  Abd: NT/ND, normal bowel sounds  Ext: 2+ pulses, no edema  Skin: No rashes    CHRONIC MEDICAL DIAGNOSES:  Problem List as of 1/29/2020 Date Reviewed: 1/28/2020          Codes Class Noted - Resolved    Amaurosis fugax ICD-10-CM: G45.3  ICD-9-CM: 362.34  1/29/2020 - Present        Internal carotid artery stenosis ICD-10-CM: I65.29  ICD-9-CM: 433.10  1/29/2020 - Present        CVA (cerebral vascular accident) [facility]) ICD-10-CM: I63.9  ICD-9-CM: 434.91  1/27/2020 - Present              Greater than 30 minutes were spent with the patient on counseling and coordination of care    Signed:   Agata Katz MD  1/29/2020  2:38 PM

## 2020-01-29 NOTE — PROGRESS NOTES
Bedside shift change report given to Trent Peacock RN (oncoming nurse) by Loren Wells RN (offgoing nurse). Report included the following information SBAR, Kardex, Accordion, Recent Results, Cardiac Rhythm NSR and Dual Neuro Assessment.

## 2020-03-03 RX ORDER — ATORVASTATIN CALCIUM 80 MG/1
80 TABLET, FILM COATED ORAL DAILY
Qty: 30 TAB | Refills: 0 | Status: SHIPPED | OUTPATIENT
Start: 2020-03-03 | End: 2020-06-09 | Stop reason: SDUPTHER

## 2020-03-03 RX ORDER — CLOPIDOGREL BISULFATE 75 MG/1
75 TABLET ORAL DAILY
Qty: 30 TAB | Refills: 0 | Status: SHIPPED | OUTPATIENT
Start: 2020-03-03 | End: 2020-06-09 | Stop reason: SDUPTHER

## 2020-06-09 ENCOUNTER — HOSPITAL ENCOUNTER (OUTPATIENT)
Dept: LAB | Age: 70
Discharge: HOME | End: 2020-06-09
Payer: MEDICARE

## 2020-06-09 ENCOUNTER — OFFICE VISIT (OUTPATIENT)
Dept: INTERNAL MEDICINE CLINIC | Age: 70
End: 2020-06-09

## 2020-06-09 VITALS
TEMPERATURE: 98 F | HEART RATE: 104 BPM | DIASTOLIC BLOOD PRESSURE: 68 MMHG | RESPIRATION RATE: 18 BRPM | SYSTOLIC BLOOD PRESSURE: 136 MMHG | WEIGHT: 202 LBS | BODY MASS INDEX: 29.92 KG/M2 | OXYGEN SATURATION: 18 % | HEIGHT: 69 IN

## 2020-06-09 DIAGNOSIS — R73.9 HYPERGLYCEMIA: ICD-10-CM

## 2020-06-09 DIAGNOSIS — M25.551 CHRONIC HIP PAIN, BILATERAL: ICD-10-CM

## 2020-06-09 DIAGNOSIS — Z00.00 MEDICARE ANNUAL WELLNESS VISIT, SUBSEQUENT: ICD-10-CM

## 2020-06-09 DIAGNOSIS — M25.512 CHRONIC PAIN OF BOTH SHOULDERS: ICD-10-CM

## 2020-06-09 DIAGNOSIS — M25.511 CHRONIC PAIN OF BOTH SHOULDERS: ICD-10-CM

## 2020-06-09 DIAGNOSIS — G89.29 CHRONIC PAIN OF BOTH SHOULDERS: ICD-10-CM

## 2020-06-09 DIAGNOSIS — I65.21 STENOSIS OF RIGHT INTERNAL CAROTID ARTERY: ICD-10-CM

## 2020-06-09 DIAGNOSIS — R94.30 EJECTION FRACTION < 50%: ICD-10-CM

## 2020-06-09 DIAGNOSIS — I10 ESSENTIAL HYPERTENSION: Primary | ICD-10-CM

## 2020-06-09 DIAGNOSIS — H02.403 DROOPY EYELID, BILATERAL: ICD-10-CM

## 2020-06-09 DIAGNOSIS — G89.29 CHRONIC HIP PAIN, BILATERAL: ICD-10-CM

## 2020-06-09 DIAGNOSIS — F17.200 SMOKER: ICD-10-CM

## 2020-06-09 DIAGNOSIS — Z11.59 NEED FOR HEPATITIS C SCREENING TEST: ICD-10-CM

## 2020-06-09 DIAGNOSIS — M25.552 CHRONIC HIP PAIN, BILATERAL: ICD-10-CM

## 2020-06-09 PROCEDURE — 80053 COMPREHEN METABOLIC PANEL: CPT

## 2020-06-09 PROCEDURE — 80061 LIPID PANEL: CPT

## 2020-06-09 PROCEDURE — 85027 COMPLETE CBC AUTOMATED: CPT

## 2020-06-09 PROCEDURE — 86803 HEPATITIS C AB TEST: CPT

## 2020-06-09 PROCEDURE — 36415 COLL VENOUS BLD VENIPUNCTURE: CPT

## 2020-06-09 PROCEDURE — 83036 HEMOGLOBIN GLYCOSYLATED A1C: CPT

## 2020-06-09 RX ORDER — TRAMADOL HYDROCHLORIDE 50 MG/1
TABLET ORAL
COMMUNITY
Start: 2019-09-16 | End: 2020-06-09 | Stop reason: SDUPTHER

## 2020-06-09 RX ORDER — TRAMADOL HYDROCHLORIDE 50 MG/1
50 TABLET ORAL
Qty: 30 TAB | Refills: 3 | Status: SHIPPED | OUTPATIENT
Start: 2020-06-09 | End: 2020-11-02

## 2020-06-09 RX ORDER — CLOPIDOGREL BISULFATE 75 MG/1
75 TABLET ORAL DAILY
Qty: 90 TAB | Refills: 1 | Status: SHIPPED | OUTPATIENT
Start: 2020-06-09 | End: 2020-12-10

## 2020-06-09 RX ORDER — NALOXONE HYDROCHLORIDE 4 MG/.1ML
1 SPRAY NASAL
Status: ON HOLD | COMMUNITY
Start: 2019-12-02 | End: 2021-04-16

## 2020-06-09 RX ORDER — ATORVASTATIN CALCIUM 80 MG/1
80 TABLET, FILM COATED ORAL DAILY
Qty: 90 TAB | Refills: 1 | Status: SHIPPED | OUTPATIENT
Start: 2020-06-09 | End: 2020-12-10

## 2020-06-09 RX ORDER — ASPIRIN 325 MG
325 TABLET ORAL DAILY
COMMUNITY

## 2020-06-09 NOTE — PROGRESS NOTES
Schedule of Personalized Health Plan  (Provide Copy to Patient)  The best way to stay healthy is to live a healthy lifestyle. A healthy lifestyle includes regular exercise, eating a well-balanced diet, keeping a healthy weight and not smoking. Regular physical exams and screening tests are another important way to take care of yourself. Preventive exams provided by health care providers can find health problems early when treatment works best and can keep you from getting certain diseases or illnesses. Preventive services include exams, lab tests, screenings, shots, monitoring and information to help you take care of your own health. All people over 65 should have a pneumonia shot. Pneumonia shots are usually only needed once in a lifetime unless your doctor decides differently. All people over 65 should have a yearly flu shot. People over 65 are at medium to high risk for Hepatitis B. Three shots are needed for complete protection. In addition to your physical exam, some screening tests are recommended:    Bone mass measurement (dexa scan) is recommended every two years if you have certain risk factors, such as personal history of vertebral fracture or chronic steroid medication use    Diabetes Mellitus screening is recommended every year. Glaucoma is an eye disease caused by high pressure in the eye. An eye exam is recommended every year. Cardiovascular screening tests that check your cholesterol and other blood fat (lipid) levels are recommended every five years. Colorectal Cancer screening tests help to find pre-cancerous polyps (growths in the colon) so they can be removed before they turn into cancer. Tests ordered for screening depend on your personal and family history risk factors.     Screening for Prostate Cancer is recommended yearly with a digital rectal exam and/or a PSA test    Here is a list of your current Health Maintenance items with a due date:  Health Maintenance Topic Date Due    Hepatitis C Screening  1950    Shingrix Vaccine Age 50> (1 of 2) 10/11/1999    FOBT Q1Y Age 50-75  10/11/1999    GLAUCOMA SCREENING Q2Y  10/11/2014    DTaP/Tdap/Td series (2 - Td) 04/06/2020    Influenza Age 9 to Adult  08/01/2020    Lipid Screen  01/28/2021    Medicare Yearly Exam  06/10/2021    Pneumococcal 65+ years  Completed

## 2020-06-09 NOTE — PROGRESS NOTES
HISTORY OF PRESENT ILLNESS  Brandon Villanueva is a 78 y.o. male. New patient who comes in to [facility]. Has a few chronic medical issues including HTN, old CVA, right ICA stenosis, left amaurosis fugax in 1/20, Bilateral shoulder and pain pain, CHF with EF under 50%, hyperglycemia, droopy eyelids. I reviewed records in [facility]. He smokes daily. Drinks alcohol socially. He is on a number of medications which I have reviewed with him. Has been taking tramadol for pain. Reports having run out of Plavix. Trying to watch his diet and be active physically. Denies any falls. Taking precautions regarding COVID-19. Denies any related signs or symptoms including fever, cough, dyspnea. Cardiac status has been stable. Needs medication refills. Due for repeat labs. HPI    Review of Systems   Constitutional: Negative. HENT: Negative. Eyes: Negative. Negative for blurred vision. Respiratory: Negative. Negative for shortness of breath. Cardiovascular: Negative. Negative for chest pain and leg swelling. Gastrointestinal: Negative. Negative for abdominal pain and heartburn. Genitourinary: Negative. Negative for dysuria. Musculoskeletal: Positive for joint pain. Negative for falls. Skin: Negative. Neurological: Negative. Negative for dizziness, sensory change, focal weakness and headaches. Endo/Heme/Allergies: Negative. Negative for polydipsia. Psychiatric/Behavioral: Negative. Negative for depression. The patient is not nervous/anxious and does not have insomnia. Physical Exam  Vitals signs and nursing note reviewed. Constitutional:       General: He is not in acute distress. Appearance: He is well-developed. Comments: Pleasant man   HENT:      Head: Normocephalic and atraumatic.       Right Ear: Tympanic membrane normal.      Left Ear: Tympanic membrane normal.      Nose: Nose normal.      Mouth/Throat:      Mouth: Mucous membranes are moist.      Pharynx: Oropharynx is clear. Eyes:      General: No scleral icterus. Conjunctiva/sclera: Conjunctivae normal.      Pupils: Pupils are equal, round, and reactive to light. Neck:      Musculoskeletal: Normal range of motion and neck supple. Thyroid: No thyromegaly. Vascular: No JVD. Cardiovascular:      Rate and Rhythm: Normal rate and regular rhythm. Heart sounds: Normal heart sounds. No murmur. Pulmonary:      Effort: Pulmonary effort is normal. No respiratory distress. Breath sounds: Normal breath sounds. Abdominal:      General: Bowel sounds are normal. There is no distension. Palpations: Abdomen is soft. Tenderness: There is no abdominal tenderness. Musculoskeletal:         General: Tenderness (Bilateral shoulders and hips) present. Right lower leg: No edema. Left lower leg: No edema. Comments: DJD   Lymphadenopathy:      Cervical: No cervical adenopathy. Skin:     General: Skin is warm and dry. Findings: No rash. Neurological:      Mental Status: He is alert and oriented to person, place, and time. Cranial Nerves: No cranial nerve deficit. Coordination: Coordination normal.   Psychiatric:         Behavior: Behavior normal.         ASSESSMENT and PLAN  Diagnoses and all orders for this visit:    1. Essential hypertension  -     LIPID PANEL  -     METABOLIC PANEL, COMPREHENSIVE  -     CBC W/O DIFF    2. Stenosis of right internal carotid artery  -     LIPID PANEL  -     METABOLIC PANEL, COMPREHENSIVE  -     CBC W/O DIFF    3. Chronic pain of both shoulders  -     traMADoL (ULTRAM) 50 mg tablet; Take 1 Tab by mouth daily as needed for Pain for up to 30 days. 4. Chronic hip pain, bilateral  -     traMADoL (ULTRAM) 50 mg tablet; Take 1 Tab by mouth daily as needed for Pain for up to 30 days. 5. Smoker    6. Ejection fraction < 50%    7. Hyperglycemia  -     HEMOGLOBIN A1C WITH EAG    8.  Need for hepatitis C screening test  -     HEPATITIS C AB    9. Droopy eyelid, bilateral    10. Medicare annual wellness visit, subsequent    Other orders  -     clopidogreL (Plavix) 75 mg tab; Take 1 Tab by mouth daily. -     atorvastatin (LIPITOR) 80 mg tablet; Take 1 Tab by mouth daily. Follow-up and Dispositions    · Return in about 4 months (around 10/9/2020).      lab results and schedule of future lab studies reviewed with patient  reviewed diet, exercise and weight control  reviewed medications and side effects in detail  F/u with other MD's as scheduled  Fall precautions discussed

## 2020-06-09 NOTE — PROGRESS NOTES
This is the Subsequent Medicare Annual Wellness Exam, performed 12 months or more after the Initial AWV or the last Subsequent AWV    I have reviewed the patient's medical history in detail and updated the computerized patient record. History     Patient Active Problem List   Diagnosis Code    CVA (cerebral vascular accident) ([facility] [address] I63.9    Amaurosis fugax G45.3    Internal carotid artery stenosis I65.29    Essential hypertension I10    Chronic pain of both shoulders M25.511, G89.29, M25.512    Chronic hip pain, bilateral M25.551, M25.552, G89.29    Smoker F17.200    Ejection fraction < 50% R94.30     Past Medical History:   Diagnosis Date    Hypertension       Past Surgical History:   Procedure Laterality Date    HX CHOLECYSTECTOMY       Current Outpatient Medications   Medication Sig Dispense Refill    traMADoL (ULTRAM) 50 mg tablet Take 1 tablet by mouth every 6 hours as needed for pain or stiffness      aspirin (ASPIRIN) 325 mg tablet Take 325 mg by mouth daily.  naloxone (Narcan) 4 mg/actuation nasal spray SEE NOTES      clopidogreL (PLAVIX) 75 mg tab Take 1 Tab by mouth daily. 30 Tab 0    atorvastatin (LIPITOR) 80 mg tablet Take 1 Tab by mouth daily. 30 Tab 0    losartan-hydroCHLOROthiazide (HYZAAR) 100-25 mg per tablet Take 1 Tab by mouth daily.  cholecalciferol (VITAMIN D3) 25 mcg (1,000 unit) cap Take 2,000 Units by mouth daily.  albuterol (PROVENTIL HFA, VENTOLIN HFA, PROAIR HFA) 90 mcg/actuation inhaler Take 2 Puffs by inhalation every six (6) hours as needed for Wheezing.  fluticasone propionate (FLONASE ALLERGY RELIEF) 50 mcg/actuation nasal spray 2 Sprays by Both Nostrils route daily.  therapeutic multivitamin (THERAGRAN) tablet Take 1 Tab by mouth daily. No Known Allergies    No family history on file.   Social History     Tobacco Use    Smoking status: Current Every Day Smoker    Smokeless tobacco: Never Used   Substance Use Topics    Alcohol use: Never     Frequency: Never       Depression Risk Factor Screening:     3 most recent PHQ Screens 6/9/2020   Little interest or pleasure in doing things Not at all   Feeling down, depressed, irritable, or hopeless Not at all   Total Score PHQ 2 0       Alcohol Risk Factor Screening (MALE > 65): Do you average more 1 drink per night or more than 7 drinks a week: No    In the past three months have you have had more than 4 drinks containing alcohol on one occasion: No      Functional Ability and Level of Safety:   Hearing: Hearing is good. Activities of Daily Living: The home contains: no safety equipment. Patient does total self care    Ambulation: with no difficulty    Fall Risk:  Fall Risk Assessment, last 12 mths 6/9/2020   Able to walk? Yes   Fall in past 12 months? No       Abuse Screen:  Patient is not abused    Cognitive Screening   Has your family/caregiver stated any concerns about your memory: no  Cognitive Screening: A+O x 3    Patient Care Team   Patient Care Team:  Christiano Raygoza DO as PCP - General (Internal Medicine)    Assessment/Plan   Education and counseling provided:  Are appropriate based on today's review and evaluation    Diagnoses and all orders for this visit:    1. Essential hypertension  -     LIPID PANEL  -     METABOLIC PANEL, COMPREHENSIVE  -     CBC W/O DIFF    2. Stenosis of right internal carotid artery  -     LIPID PANEL  -     METABOLIC PANEL, COMPREHENSIVE  -     CBC W/O DIFF    3. Chronic pain of both shoulders    4. Chronic hip pain, bilateral    5. Smoker    6. Ejection fraction < 50%    7. Hyperglycemia  -     HEMOGLOBIN A1C WITH EAG    8.  Need for hepatitis C screening test  -     HEPATITIS C AB        Health Maintenance Due   Topic Date Due    Hepatitis C Screening  1950    Shingrix Vaccine Age 50> (1 of 2) 10/11/1999    FOBT Q1Y Age 50-75  10/11/1999    GLAUCOMA SCREENING Q2Y  10/11/2014    DTaP/Tdap/Td series (2 - Td) 04/06/2020

## 2020-06-09 NOTE — PROGRESS NOTES
Health Maintenance Due   Topic Date Due    Hepatitis C Screening  1950    Shingrix Vaccine Age 50> (1 of 2) 10/11/1999    FOBT Q1Y Age 50-75  10/11/1999    GLAUCOMA SCREENING Q2Y  10/11/2014    Medicare Yearly Exam  01/27/2020    DTaP/Tdap/Td series (2 - Td) 04/06/2020       Chief Complaint   Patient presents with   [address] [facility] [address] Patient    Cerebrovascular Accident     hx of    Shoulder Pain     bilateral shoulder, has been on tramadol    Hip Pain     states SI joint    Carotid Artery Stenosis    Blood Clot     right carotid artery, put on plavix but has run out       1. Have you been to the ER, urgent care clinic since your last visit? Hospitalized since your last visit? No    2. Have you seen or consulted any other health care providers outside of the [address] [place] [address] since your last visit? Include any pap smears or colon screening. No    3) Do you have an Advance Directive on file? no    4) Are you interested in receiving information on Advance Directives? NO      Patient is accompanied by self I have received verbal consent from [employer] to discuss any/all medical information while they are present in the room.

## 2020-06-10 LAB
ALBUMIN SERPL-MCNC: 5 G/DL (ref 3.8–4.8)
ALBUMIN/GLOB SERPL: 2.4 {RATIO} (ref 1.2–2.2)
ALP SERPL-CCNC: 83 IU/L (ref 39–117)
ALT SERPL-CCNC: 27 IU/L (ref 0–44)
AST SERPL-CCNC: 27 IU/L (ref 0–40)
BILIRUB SERPL-MCNC: 0.5 MG/DL (ref 0–1.2)
BUN SERPL-MCNC: 16 MG/DL (ref 8–27)
BUN/CREAT SERPL: 17 (ref 10–24)
CALCIUM SERPL-MCNC: 9.8 MG/DL (ref 8.6–10.2)
CHLORIDE SERPL-SCNC: 98 MMOL/L (ref 96–106)
CHOLEST SERPL-MCNC: 208 MG/DL (ref 100–199)
CO2 SERPL-SCNC: 19 MMOL/L (ref 20–29)
CREAT SERPL-MCNC: 0.95 MG/DL (ref 0.76–1.27)
ERYTHROCYTE [DISTWIDTH] IN BLOOD BY AUTOMATED COUNT: 12.4 % (ref 11.6–15.4)
EST. AVERAGE GLUCOSE BLD GHB EST-MCNC: 108 MG/DL
GLOBULIN SER CALC-MCNC: 2.1 G/DL (ref 1.5–4.5)
GLUCOSE SERPL-MCNC: 98 MG/DL (ref 65–99)
HBA1C MFR BLD: 5.4 % (ref 4.8–5.6)
HCT VFR BLD AUTO: 43.9 % (ref 37.5–51)
HCV AB S/CO SERPL IA: <0.1 S/CO RATIO (ref 0–0.9)
HDLC SERPL-MCNC: 47 MG/DL
HGB BLD-MCNC: 15.1 G/DL (ref 13–17.7)
INTERPRETATION, 910389: NORMAL
LDLC SERPL CALC-MCNC: 116 MG/DL (ref 0–99)
MCH RBC QN AUTO: 31.8 PG (ref 26.6–33)
MCHC RBC AUTO-ENTMCNC: 34.4 G/DL (ref 31.5–35.7)
MCV RBC AUTO: 92 FL (ref 79–97)
PLATELET # BLD AUTO: 259 X10E3/UL (ref 150–450)
POTASSIUM SERPL-SCNC: 4.2 MMOL/L (ref 3.5–5.2)
PROT SERPL-MCNC: 7.1 G/DL (ref 6–8.5)
RBC # BLD AUTO: 4.75 X10E6/UL (ref 4.14–5.8)
SODIUM SERPL-SCNC: 138 MMOL/L (ref 134–144)
TRIGL SERPL-MCNC: 223 MG/DL (ref 0–149)
VLDLC SERPL CALC-MCNC: 45 MG/DL (ref 5–40)
WBC # BLD AUTO: 10.4 X10E3/UL (ref 3.4–10.8)

## 2020-06-16 NOTE — PROGRESS NOTES
Cholesterol is higher than last time---- watch fatty food/exercise  Continue Lipitor daily  All labs are stable

## 2020-07-07 ENCOUNTER — OFFICE VISIT (OUTPATIENT)
Dept: INTERNAL MEDICINE CLINIC | Age: 70
End: 2020-07-07

## 2020-07-07 ENCOUNTER — TELEPHONE (OUTPATIENT)
Dept: INTERNAL MEDICINE CLINIC | Age: 70
End: 2020-07-07

## 2020-07-07 VITALS — HEIGHT: 69 IN | BODY MASS INDEX: 30.07 KG/M2 | WEIGHT: 203 LBS

## 2020-07-07 DIAGNOSIS — M25.552 CHRONIC HIP PAIN, BILATERAL: ICD-10-CM

## 2020-07-07 DIAGNOSIS — M25.551 CHRONIC HIP PAIN, BILATERAL: ICD-10-CM

## 2020-07-07 DIAGNOSIS — G89.29 CHRONIC PAIN OF BOTH SHOULDERS: ICD-10-CM

## 2020-07-07 DIAGNOSIS — M25.512 CHRONIC PAIN OF BOTH SHOULDERS: ICD-10-CM

## 2020-07-07 DIAGNOSIS — G89.29 CHRONIC HIP PAIN, BILATERAL: ICD-10-CM

## 2020-07-07 DIAGNOSIS — H02.403 DROOPY EYELID, BILATERAL: Primary | ICD-10-CM

## 2020-07-07 DIAGNOSIS — Z01.810 PREOP CARDIOVASCULAR EXAM: ICD-10-CM

## 2020-07-07 DIAGNOSIS — M25.511 CHRONIC PAIN OF BOTH SHOULDERS: ICD-10-CM

## 2020-07-07 NOTE — TELEPHONE ENCOUNTER
I returned call to patient. He would like a copy of his physical that is being completed for his eye surgery on Friday. He will call and come by tomorrow 7/8 to pick it up.

## 2020-07-07 NOTE — PROGRESS NOTES
Health Maintenance Due   Topic Date Due    Shingrix Vaccine Age 49> (1 of 2) 10/11/1999    FOBT Q1Y Age 54-65  10/11/1999    DTaP/Tdap/Td series (2 - Td) 04/06/2020       Chief Complaint   Patient presents with    Pre-op Exam     eyelid with Dr Ignacio Sousa       1. Have you been to the ER, urgent care clinic since your last visit? Hospitalized since your last visit? No    2. Have you seen or consulted any other health care providers outside of the [address] [place] [address] since your last visit? Include any pap smears or colon screening. No    3) Do you have an Advance Directive on file? no    4) Are you interested in receiving information on Advance Directives? NO      Patient is accompanied by self I have received verbal consent from Juan Davis to discuss any/all medical information while they are present in the room.

## 2020-07-07 NOTE — PROGRESS NOTES
Who she is HISTORY OF PRESENT ILLNESS  Justus Gill is a 78 y.o. male. Pt. comes in for f/u. Has a few chronic medical issues as documented. Patient has bilateral droopy eyelids which are interfering with his vision. Scheduled for surgery in a few days by Dr. Calla Litten. Has had previous cataract surgery. Has chronic arthritic pain in shoulders and hips. Takes tramadol and Tylenol for pain. Has history of ICA stenosis. Has been on Lipitor and Plavix. Holding Plavix and aspirin now before surgery. No new complaints. PMH/PSH/Allergies/Social History/medication list and most recent studies reviewed with patient. All look stable. Tobacco use: No  Alcohol use: Social    Reports compliance with medications and diet. Trying to be active physically to control weight. Reports no other new c/o. HPI    Review of Systems   Constitutional: Negative. HENT: Negative. Eyes: Negative. Negative for blurred vision. Respiratory: Negative. Negative for shortness of breath. Cardiovascular: Negative. Negative for chest pain and leg swelling. Gastrointestinal: Negative. Negative for abdominal pain and heartburn. Genitourinary: Negative. Negative for dysuria. Musculoskeletal: Positive for joint pain. Negative for falls. Skin: Negative. Neurological: Negative. Negative for dizziness, sensory change, focal weakness and headaches. Endo/Heme/Allergies: Negative. Negative for polydipsia. Psychiatric/Behavioral: Negative. Negative for depression. The patient is not nervous/anxious and does not have insomnia. Physical Exam  Vitals signs and nursing note reviewed. Constitutional:       General: He is not in acute distress. Appearance: He is well-developed. Comments: Pleasant man   HENT:      Head: Normocephalic and atraumatic. Nose: Nose normal.      Mouth/Throat:      Mouth: Mucous membranes are moist.      Pharynx: Oropharynx is clear.    Eyes:      General: No scleral icterus. Conjunctiva/sclera: Conjunctivae normal.      Pupils: Pupils are equal, round, and reactive to light. Comments: Bilateral droopy eyelids   Neck:      Musculoskeletal: Normal range of motion and neck supple. Thyroid: No thyromegaly. Vascular: No JVD. Cardiovascular:      Rate and Rhythm: Normal rate and regular rhythm. Heart sounds: Normal heart sounds. No murmur. Pulmonary:      Effort: Pulmonary effort is normal. No respiratory distress. Breath sounds: Normal breath sounds. Abdominal:      General: Bowel sounds are normal.      Palpations: Abdomen is soft. Tenderness: There is no abdominal tenderness. Musculoskeletal:         General: Tenderness (Bilateral shoulders and hips) present. Right lower leg: No edema. Left lower leg: No edema. Comments: DJD   Skin:     General: Skin is warm and dry. Findings: No rash. Neurological:      Mental Status: He is alert and oriented to person, place, and time. Cranial Nerves: No cranial nerve deficit. Coordination: Coordination normal.   Psychiatric:         Behavior: Behavior normal.         ASSESSMENT and PLAN  Diagnoses and all orders for this visit:    1. Droopy eyelid, bilateral    2. Preop exam    3. Chronic hip pain, bilateral  -     REFERRAL TO ORTHOPEDICS    4.  Chronic pain of both shoulders  -     REFERRAL TO ORTHOPEDICS      Follow-up and Dispositions    · Return if symptoms worsen or fail to improve.     lab results and schedule of future lab studies reviewed with patient  reviewed diet, exercise and weight control  reviewed medications and side effects in detail  Is medically stable for eye surgery  Preop form filled and faxed to ophthalmologist  Will refer to orthopedic MD for his chronic joint pains

## 2020-10-06 ENCOUNTER — OFFICE VISIT (OUTPATIENT)
Dept: INTERNAL MEDICINE CLINIC | Age: 70
End: 2020-10-06
Payer: MEDICARE

## 2020-10-06 VITALS
SYSTOLIC BLOOD PRESSURE: 130 MMHG | WEIGHT: 205 LBS | TEMPERATURE: 97.8 F | BODY MASS INDEX: 30.36 KG/M2 | DIASTOLIC BLOOD PRESSURE: 80 MMHG | HEART RATE: 76 BPM | HEIGHT: 69 IN | RESPIRATION RATE: 18 BRPM | OXYGEN SATURATION: 96 %

## 2020-10-06 DIAGNOSIS — Z23 NEEDS FLU SHOT: ICD-10-CM

## 2020-10-06 DIAGNOSIS — M25.552 CHRONIC HIP PAIN, BILATERAL: ICD-10-CM

## 2020-10-06 DIAGNOSIS — G89.29 CHRONIC HIP PAIN, BILATERAL: ICD-10-CM

## 2020-10-06 DIAGNOSIS — M25.551 CHRONIC HIP PAIN, BILATERAL: ICD-10-CM

## 2020-10-06 DIAGNOSIS — I10 ESSENTIAL HYPERTENSION: Primary | ICD-10-CM

## 2020-10-06 DIAGNOSIS — I65.21 STENOSIS OF RIGHT INTERNAL CAROTID ARTERY: ICD-10-CM

## 2020-10-06 DIAGNOSIS — M48.062 SPINAL STENOSIS OF LUMBAR REGION WITH NEUROGENIC CLAUDICATION: ICD-10-CM

## 2020-10-06 DIAGNOSIS — B35.6 JOCK ITCH: ICD-10-CM

## 2020-10-06 PROCEDURE — 3017F COLORECTAL CA SCREEN DOC REV: CPT | Performed by: INTERNAL MEDICINE

## 2020-10-06 PROCEDURE — 1101F PT FALLS ASSESS-DOCD LE1/YR: CPT | Performed by: INTERNAL MEDICINE

## 2020-10-06 PROCEDURE — G8536 NO DOC ELDER MAL SCRN: HCPCS | Performed by: INTERNAL MEDICINE

## 2020-10-06 PROCEDURE — G8427 DOCREV CUR MEDS BY ELIG CLIN: HCPCS | Performed by: INTERNAL MEDICINE

## 2020-10-06 PROCEDURE — G0008 ADMIN INFLUENZA VIRUS VAC: HCPCS | Performed by: INTERNAL MEDICINE

## 2020-10-06 PROCEDURE — 90694 VACC AIIV4 NO PRSRV 0.5ML IM: CPT | Performed by: INTERNAL MEDICINE

## 2020-10-06 PROCEDURE — G8752 SYS BP LESS 140: HCPCS | Performed by: INTERNAL MEDICINE

## 2020-10-06 PROCEDURE — G8417 CALC BMI ABV UP PARAM F/U: HCPCS | Performed by: INTERNAL MEDICINE

## 2020-10-06 PROCEDURE — G8510 SCR DEP NEG, NO PLAN REQD: HCPCS | Performed by: INTERNAL MEDICINE

## 2020-10-06 PROCEDURE — G8754 DIAS BP LESS 90: HCPCS | Performed by: INTERNAL MEDICINE

## 2020-10-06 PROCEDURE — 99214 OFFICE O/P EST MOD 30 MIN: CPT | Performed by: INTERNAL MEDICINE

## 2020-10-06 RX ORDER — CLOTRIMAZOLE AND BETAMETHASONE DIPROPIONATE 10; .64 MG/G; MG/G
CREAM TOPICAL 2 TIMES DAILY
Qty: 45 G | Refills: 0 | Status: SHIPPED | OUTPATIENT
Start: 2020-10-06 | End: 2021-02-09 | Stop reason: ALTCHOICE

## 2020-10-06 NOTE — PATIENT INSTRUCTIONS
Vaccine Information Statement    Influenza (Flu) Vaccine (Inactivated or Recombinant): What You Need to Know    Many Vaccine Information Statements are available in Frisian and other languages. See www.immunize.org/vis  Hojas de información sobre vacunas están disponibles en español y en muchos otros idiomas. Visite www.immunize.org/vis    1. Why get vaccinated? Influenza vaccine can prevent influenza (flu). Flu is a contagious disease that spreads around the [facility] every year, usually between October and May. Anyone can get the flu, but it is more dangerous for some people. Infants and young children, people 72years of age and older, pregnant women, and people with certain health conditions or a weakened immune system are at greatest risk of flu complications. Pneumonia, bronchitis, sinus infections and ear infections are examples of flu-related complications. If you have a medical condition, such as heart disease, cancer or diabetes, flu can make it worse. Flu can cause fever and chills, sore throat, muscle aches, fatigue, cough, headache, and runny or stuffy nose. Some people may have vomiting and diarrhea, though this is more common in children than adults. Each year thousands of people in the [facility] die from flu, and many more are hospitalized. Flu vaccine prevents millions of illnesses and flu-related visits to the doctor each year. 2. Influenza vaccines     CDC recommends everyone 10months of age and older get vaccinated every flu season. Children 6 months through 6years of age may need 2 doses during a single flu season. Everyone else needs only 1 dose each flu season. It takes about 2 weeks for protection to develop after vaccination. There are many flu viruses, and they are always changing. Each year a new flu vaccine is made to protect against three or four viruses that are likely to cause disease in the upcoming flu season.  Even when the vaccine doesnt exactly match these viruses, it may still provide some protection. Influenza vaccine does not cause flu. Influenza vaccine may be given at the same time as other vaccines. 3. Talk with your health care provider    Tell your vaccine provider if the person getting the vaccine:   Has had an allergic reaction after a previous dose of influenza vaccine, or has any severe, life-threatening allergies.  Has ever had Guillain-Barré Syndrome (also called GBS). In some cases, your health care provider may decide to postpone influenza vaccination to a future visit. People with minor illnesses, such as a cold, may be vaccinated. People who are moderately or severely ill should usually wait until they recover before getting influenza vaccine. Your health care provider can give you more information. 4. Risks of a reaction     Soreness, redness, and swelling where shot is given, fever, muscle aches, and headache can happen after influenza vaccine.  There may be a very small increased risk of Guillain-Barré Syndrome (GBS) after inactivated influenza vaccine (the flu shot). Courtney Becker children who get the flu shot along with pneumococcal vaccine (PCV13), and/or DTaP vaccine at the same time might be slightly more likely to have a seizure caused by fever. Tell your health care provider if a child who is getting flu vaccine has ever had a seizure. People sometimes faint after medical procedures, including vaccination. Tell your provider if you feel dizzy or have vision changes or ringing in the ears. As with any medicine, there is a very remote chance of a vaccine causing a severe allergic reaction, other serious injury, or death. 5. What if there is a serious problem? An allergic reaction could occur after the vaccinated person leaves the clinic.  If you see signs of a severe allergic reaction (hives, swelling of the face and throat, difficulty breathing, a fast heartbeat, dizziness, or weakness), call 9-1-1 and get the person to the nearest hospital.    For other signs that concern you, call your health care provider. Adverse reactions should be reported to the Vaccine Adverse Event Reporting System (VAERS). Your health care provider will usually file this report, or you can do it yourself. Visit the VAERS website at www.vaers. [facility].gov or call 7-867.918.9330. VAERS is only for reporting reactions, and VAERS staff do not give medical advice. 6. The National Vaccine Injury Compensation Program    The [facility] Vaccine Injury Compensation Program (VICP) is a federal program that was created to compensate people who may have been injured by certain vaccines. Visit the VICP website at www.hrsa.gov/vaccinecompensation or call 7-670.466.7020 to learn about the program and about filing a claim. There is a time limit to file a claim for compensation. 7. How can I learn more?  Ask your health care provider.  Call your local or state health department.  Contact the Centers for Disease Control and Prevention (CDC):  - Call 0-856.109.4887 (1-800-CDC-INFO) or  - Visit CDCs influenza website at www.cdc.gov/flu    Vaccine Information Statement (Interim)  Inactivated Influenza Vaccine   8/15/2019  [address]   Department of Health and Human Services  Centers for Disease Control and Prevention    Office Use Only

## 2020-10-06 NOTE — PROGRESS NOTES
ADVISED PATIENT OF THE FOLLOWING HEALTH MAINTAINCE DUE  Health Maintenance Due   Topic Date Due    Shingrix Vaccine Age 49> (1 of 2) 10/11/1999    FOBT Q1Y Age 54-65  10/11/1999    DTaP/Tdap/Td series (2 - Td) 04/06/2020    Flu Vaccine (1) 09/01/2020      Chief Complaint   Patient presents with    Cerebrovascular Accident     4 month f/u     Back Pain    Yeast Infection     in groin area     Hypertension    Bleeding/Bruising       1. Have you been to the ER, urgent care clinic since your last visit? Hospitalized since your last visit? No    2. Have you seen or consulted any other health care providers outside of the [address] [place] [address] since your last visit? Include any DEXA scan, mammography  or colon screening. No    3. Do you have an Advance Directive on file? no    4. Do you have a DNR on file? no    Patient is accompanied by self I have received verbal consent from Salomon Barbosa to discuss any/all medical information while they are present in the room. Advance Care Planning 1/28/2020   Patient's Healthcare Decision Maker is: Legal Next of Kin   Confirm Advance Directive None   Patient Would Like to Complete Advance Directive No         [facility] [id number] Jairo Macias, [address] [place] [id number]  Phone: [phone] Fax: [phone]        Patient reminded during visit to bring all medication bottles, OTC medications to all appointments. Salomon Barbosa is a 78 y.o. male  who presents for routine immunization(s). Patient denies any symptoms , reactions or allergies that would exclude them from being immunized today. Risks and adverse reactions were discussed. The patient/caregiver was provided the VIS and allotted time to read and ask questions prior to administration of vaccine. Patient voiced full understanding and signed Adult Immunization Consent form. All questions were addressed.   Patient was observed for 10 min post injection. There were no reactions observed.

## 2020-10-08 ENCOUNTER — TELEPHONE (OUTPATIENT)
Dept: INTERNAL MEDICINE CLINIC | Age: 70
End: 2020-10-08

## 2020-10-08 DIAGNOSIS — M25.559 HIP PAIN: Primary | ICD-10-CM

## 2020-10-08 NOTE — TELEPHONE ENCOUNTER
Pt has spinal stynosis and wants physical therapy for his shoulders. [facility] referred patient to therapy for his hips. Pateint wants to continue his therapy and add the therapy for his shoulders at [facility].  Please contact patient to inform when he can schedule his appointment

## 2020-10-12 NOTE — PROGRESS NOTES
HISTORY OF PRESENT ILLNESS  Yohannes Torrez is a 69 y.o. male. Pt. comes in for f/u. Has a few chronic medical issues as documented. Vital signs including BP are stable. History of CVA and carotid stenosis. Denies any new neurological issues. Has chronic back and hip pain. Gait is slow but no falls. Reports an itchy rash in groin area. Tends to sweat. Had recent eyelid surgery with success. Reports bruising easily. He is on aspirin and Plavix. Denies any signs or symptoms of COVID-19. PMH/PSH/Allergies/Social History/medication list and most recent studies reviewed with patient. Tobacco use: No  Alcohol use: No    Reports compliance with medications and diet. Trying to be active physically to control weight. Reports no other new c/o. HPI    Review of Systems   Constitutional: Negative. HENT: Negative. Eyes: Negative. Negative for blurred vision. Respiratory: Negative. Negative for shortness of breath. Cardiovascular: Negative. Negative for chest pain and leg swelling. Gastrointestinal: Negative. Negative for abdominal pain and heartburn. Genitourinary: Negative. Negative for dysuria. Musculoskeletal: Positive for joint pain. Negative for falls. Skin: Positive for itching and rash. Neurological: Negative. Negative for dizziness, sensory change, focal weakness and headaches. Endo/Heme/Allergies: Negative. Negative for polydipsia. Psychiatric/Behavioral: Negative. Negative for depression. The patient is not nervous/anxious and does not have insomnia. Physical Exam  Vitals signs and nursing note reviewed. Constitutional:       General: He is not in acute distress. Appearance: He is well-developed. Comments: Pleasant man   HENT:      Head: Normocephalic and atraumatic. Nose: Nose normal.      Mouth/Throat:      Mouth: Mucous membranes are moist.      Pharynx: Oropharynx is clear. Eyes:      General: No scleral icterus.      Conjunctiva/sclera: Conjunctivae normal.   Neck:      Musculoskeletal: Normal range of motion and neck supple. Thyroid: No thyromegaly. Vascular: No JVD. Cardiovascular:      Rate and Rhythm: Normal rate and regular rhythm. Heart sounds: Normal heart sounds. No murmur. Pulmonary:      Effort: Pulmonary effort is normal. No respiratory distress. Breath sounds: Normal breath sounds. Abdominal:      General: Bowel sounds are normal. There is no distension. Palpations: Abdomen is soft. Tenderness: There is no abdominal tenderness. Musculoskeletal:         General: Tenderness (Bilateral shoulders and hips, chronic) present. Right lower leg: No edema. Left lower leg: No edema. Comments: DJD   Lymphadenopathy:      Cervical: No cervical adenopathy. Skin:     General: Skin is warm and dry. Findings: Rash (Growing/pubic area) present. Neurological:      Mental Status: He is alert and oriented to person, place, and time. Cranial Nerves: No cranial nerve deficit. Coordination: Coordination normal.   Psychiatric:         Behavior: Behavior normal.         ASSESSMENT and PLAN  Diagnoses and all orders for this visit:    1. Essential hypertension    2. Needs flu shot  -     FLU (FLUAD QUAD INFLUENZA VACCINE,QUAD,ADJUVANTED)    3. Stenosis of right internal carotid artery    4. Chronic hip pain, bilateral    5. Spinal stenosis of lumbar region with neurogenic claudication    6. Jock itch    Other orders  -     clotrimazole-betamethasone (LOTRISONE) topical cream; Apply  to affected area two (2) times a day. Follow-up and Dispositions    · Return in about 4 months (around 2/6/2021).      lab results and schedule of future lab studies reviewed with patient  reviewed diet, exercise and weight control  reviewed medications and side effects in detail  F/u with other MD's as scheduled  Fall precautions discussed  COVID-19 precautions discussed with pt

## 2020-11-02 DIAGNOSIS — M25.552 CHRONIC HIP PAIN, BILATERAL: ICD-10-CM

## 2020-11-02 DIAGNOSIS — M25.512 CHRONIC PAIN OF BOTH SHOULDERS: ICD-10-CM

## 2020-11-02 DIAGNOSIS — M25.551 CHRONIC HIP PAIN, BILATERAL: ICD-10-CM

## 2020-11-02 DIAGNOSIS — G89.29 CHRONIC PAIN OF BOTH SHOULDERS: ICD-10-CM

## 2020-11-02 DIAGNOSIS — G89.29 CHRONIC HIP PAIN, BILATERAL: ICD-10-CM

## 2020-11-02 DIAGNOSIS — M25.511 CHRONIC PAIN OF BOTH SHOULDERS: ICD-10-CM

## 2020-11-02 RX ORDER — TRAMADOL HYDROCHLORIDE 50 MG/1
TABLET ORAL
Qty: 30 TAB | Refills: 0 | Status: SHIPPED | OUTPATIENT
Start: 2020-11-02 | End: 2020-12-13 | Stop reason: SDUPTHER

## 2020-12-10 RX ORDER — CLOPIDOGREL BISULFATE 75 MG/1
TABLET ORAL
Qty: 90 TAB | Refills: 1 | Status: SHIPPED | OUTPATIENT
Start: 2020-12-10 | End: 2021-04-09

## 2020-12-10 RX ORDER — ATORVASTATIN CALCIUM 80 MG/1
TABLET, FILM COATED ORAL
Qty: 90 TAB | Refills: 1 | Status: SHIPPED | OUTPATIENT
Start: 2020-12-10 | End: 2021-04-09

## 2020-12-13 DIAGNOSIS — M25.511 CHRONIC PAIN OF BOTH SHOULDERS: ICD-10-CM

## 2020-12-13 DIAGNOSIS — M25.552 CHRONIC HIP PAIN, BILATERAL: ICD-10-CM

## 2020-12-13 DIAGNOSIS — G89.29 CHRONIC HIP PAIN, BILATERAL: ICD-10-CM

## 2020-12-13 DIAGNOSIS — G89.29 CHRONIC PAIN OF BOTH SHOULDERS: ICD-10-CM

## 2020-12-13 DIAGNOSIS — M25.512 CHRONIC PAIN OF BOTH SHOULDERS: ICD-10-CM

## 2020-12-13 DIAGNOSIS — M25.551 CHRONIC HIP PAIN, BILATERAL: ICD-10-CM

## 2020-12-14 RX ORDER — TRAMADOL HYDROCHLORIDE 50 MG/1
50 TABLET ORAL
Qty: 30 TAB | Refills: 0 | Status: SHIPPED | OUTPATIENT
Start: 2020-12-14 | End: 2021-01-13

## 2020-12-24 ENCOUNTER — VIRTUAL VISIT (OUTPATIENT)
Dept: INTERNAL MEDICINE CLINIC | Age: 70
End: 2020-12-24
Payer: MEDICARE

## 2020-12-24 DIAGNOSIS — I10 ESSENTIAL HYPERTENSION: ICD-10-CM

## 2020-12-24 DIAGNOSIS — I63.9 CEREBROVASCULAR ACCIDENT (CVA), UNSPECIFIED MECHANISM (HCC): ICD-10-CM

## 2020-12-24 DIAGNOSIS — B35.6 JOCK ITCH: ICD-10-CM

## 2020-12-24 DIAGNOSIS — R19.7 DIARRHEA, UNSPECIFIED TYPE: Primary | ICD-10-CM

## 2020-12-24 PROCEDURE — G8756 NO BP MEASURE DOC: HCPCS | Performed by: INTERNAL MEDICINE

## 2020-12-24 PROCEDURE — G8417 CALC BMI ABV UP PARAM F/U: HCPCS | Performed by: INTERNAL MEDICINE

## 2020-12-24 PROCEDURE — G8510 SCR DEP NEG, NO PLAN REQD: HCPCS | Performed by: INTERNAL MEDICINE

## 2020-12-24 PROCEDURE — 99214 OFFICE O/P EST MOD 30 MIN: CPT | Performed by: INTERNAL MEDICINE

## 2020-12-24 PROCEDURE — G8427 DOCREV CUR MEDS BY ELIG CLIN: HCPCS | Performed by: INTERNAL MEDICINE

## 2020-12-24 PROCEDURE — 1101F PT FALLS ASSESS-DOCD LE1/YR: CPT | Performed by: INTERNAL MEDICINE

## 2020-12-24 PROCEDURE — G8536 NO DOC ELDER MAL SCRN: HCPCS | Performed by: INTERNAL MEDICINE

## 2020-12-24 PROCEDURE — G0463 HOSPITAL OUTPT CLINIC VISIT: HCPCS | Performed by: INTERNAL MEDICINE

## 2020-12-24 PROCEDURE — 3017F COLORECTAL CA SCREEN DOC REV: CPT | Performed by: INTERNAL MEDICINE

## 2020-12-24 RX ORDER — CHOLESTYRAMINE 4 G/4.8G
4 POWDER, FOR SUSPENSION ORAL
Qty: 40 PACKET | Refills: 1 | Status: SHIPPED | OUTPATIENT
Start: 2020-12-24 | End: 2021-01-28 | Stop reason: SDUPTHER

## 2020-12-24 RX ORDER — FLUCONAZOLE 100 MG/1
100 TABLET ORAL DAILY
Qty: 3 TAB | Refills: 0 | Status: SHIPPED | OUTPATIENT
Start: 2020-12-24 | End: 2020-12-27

## 2020-12-24 RX ORDER — DIPHENOXYLATE HYDROCHLORIDE AND ATROPINE SULFATE 2.5; .025 MG/1; MG/1
1 TABLET ORAL
Qty: 20 TAB | Refills: 0 | Status: SHIPPED | OUTPATIENT
Start: 2020-12-24 | End: 2021-02-09 | Stop reason: ALTCHOICE

## 2020-12-24 RX ORDER — DIPHENOXYLATE HYDROCHLORIDE AND ATROPINE SULFATE 2.5; .025 MG/1; MG/1
TABLET ORAL
COMMUNITY
Start: 2020-12-14 | End: 2020-12-24 | Stop reason: SDUPTHER

## 2020-12-24 NOTE — PROGRESS NOTES
Royal Deutsch is a 69 y.o. male who was seen by synchronous (real-time) audio-video technology on 12/24/2020 for Diarrhea (started 12/8, [facility]), Cerebrovascular Accident, and Hypertension        Assessment & Plan:   Diagnoses and all orders for this visit:    1. Diarrhea, unspecified type    He has been suffering from frequent loose stool for past 3 weeks. At least 4-5 times a day he is having bowel movement. [facility], all labs done came back negative, urine was negative, stool cultures negative for any blood and C. difficile. He was prescribed Lomotil. Lomotil is not helping him right now. We will give,  -     cholestyramine-aspartame (QUESTRAN LIGHT) 4 gram packet; Take 1 Packet by mouth two (2) times daily as needed for Diarrhea. -     diphenoxylate-atropine (LOMOTIL) 2.5-0.025 mg per tablet; Take 1 Tab by mouth two (2) times daily as needed for Diarrhea. Max Daily Amount: 2 Tabs. He has an appointment to see GI, Dr. Akash Mooney in 2 weeks. 2. Essential hypertension  Stable blood pressure. On Hyzaar. CMP stable. 3. Jock itch    Already on Lotrisone, but not helping him. We will add,  -     fluconazole (DIFLUCAN) 100 mg tablet; Take 1 Tab by mouth daily for 3 days. For ongoing jock itch. 4. Cerebrovascular accident (CVA), unspecified mechanism ([address]  Stable. On Plavix and statin. I spent at least 25 minutes on this visit with this established patient. Subjective:   Mr. Paulino Jimenes is here for follow-up. [facility] of ongoing diarrhea for past 3 weeks. He is having diarrhea 3-4 times a day, stools mostly liquid, no blood in the stool. Mild abdominal cramping. No appetite loss. [facility] done lab work and UA all came back negative. Stool for C. difficile done, was negative. He is uncomfortable, Lomotil is not helping him. He made an appointment to see GI specialist Dr. Akash Mooney in 2 weeks. Report jock itch which is not getting better.   He was prescribed Lotrisone, not helping him. Has history of CVA, on Plavix and statin, doing well. Has hypertension, compliant with medications. Labs are stable. Prior to Admission medications    Medication Sig Start Date End Date Taking? Authorizing Provider   cholestyramine-aspartame (QUESTRAN LIGHT) 4 gram packet Take 1 Packet by mouth two (2) times daily as needed for Diarrhea. 12/24/20  Yes Shola Kilgore MD   diphenoxylate-atropine (LOMOTIL) 2.5-0.025 mg per tablet Take 1 Tab by mouth two (2) times daily as needed for Diarrhea. Max Daily Amount: 2 Tabs. 12/24/20  Yes Shola Kilgore MD   fluconazole (DIFLUCAN) 100 mg tablet Take 1 Tab by mouth daily for 3 days. For ongoing jock itch. 12/24/20 12/27/20 Yes Shola Kilgore MD   traMADoL Drake Jose D) 50 mg tablet Take 1 Tab by mouth daily as needed for Pain for up to 30 days. 12/14/20 1/13/21 Yes Sylvain Rodriguez DO   clopidogreL (PLAVIX) 75 mg tab TAKE 1 TABLET BY MOUTH DAILY 12/10/20  Yes Clayton Rodriguez DO   atorvastatin (LIPITOR) 80 mg tablet TAKE 1 TABLET BY MOUTH DAILY 12/10/20  Yes Clayton Rodriguez DO   losartan-hydroCHLOROthiazide (HYZAAR) 100-25 mg per tablet TAKE 1 TABLET BY MOUTH EVERY DAY 11/6/20  Yes Filemon Hoffman NP   clotrimazole-betamethasone (LOTRISONE) topical cream Apply  to affected area two (2) times a day. 10/6/20  Yes Clayton Rodriguez DO   aspirin (ASPIRIN) 325 mg tablet Take 325 mg by mouth daily. Yes Provider, Historical   naloxone (Narcan) 4 mg/actuation nasal spray SEE NOTES 12/2/19  Yes Provider, Historical   cholecalciferol (VITAMIN D3) 25 mcg (1,000 unit) cap Take 2,000 Units by mouth daily. 11/30/19  Yes Provider, Historical   albuterol (PROVENTIL HFA, VENTOLIN HFA, PROAIR HFA) 90 mcg/actuation inhaler Take 2 Puffs by inhalation every six (6) hours as needed for Wheezing. Yes Provider, Historical   fluticasone propionate (FLONASE ALLERGY RELIEF) 50 mcg/actuation nasal spray 2 Sprays by Both Nostrils route daily.    Yes Provider, Historical   therapeutic multivitamin (THERAGRAN) tablet Take 1 Tab by mouth daily. Yes Provider, Historical   diphenoxylate-atropine (LOMOTIL) 2.5-0.025 mg per tablet  12/14/20 12/24/20  Provider, Historical     Past Medical History:   Diagnosis Date    Hypertension        ROS significant for diarrhea, also rash on the groin. Objective:   No flowsheet data found. Constitutional: [x] Appears well-developed and well-nourished [x] No apparent distress      [] Abnormal -     Mental status: [x] Alert and awake  [x] Oriented to person/place/time [x] Able to follow commands    [] Abnormal -       HENT: [x] Normocephalic, atraumatic  [] Abnormal -   [x] Mouth/Throat: Mucous membranes are moist    External Ears [x] Normal  [] Abnormal -    Neck: [x] No visualized mass [] Abnormal -     Pulmonary/Chest: [x] Respiratory effort normal   [x] No visualized signs of difficulty breathing or respiratory distress        [] Abnormal -      Musculoskeletal:   [x] Normal gait with no signs of ataxia         [x] Normal range of motion of neck        [] Abnormal -     Neurological:        [x] No Facial Asymmetry (Cranial nerve 7 motor function) (limited exam due to video visit)          [x] No gaze palsy        [] Abnormal -          Skin:   Both groin: Red inflamed skin, itchy           Psychiatric:       [x] Normal Affect [] Abnormal -        [x] No Hallucinations    Other pertinent observable physical exam findings:-        We discussed the expected course, resolution and complications of the diagnosis(es) in detail. Medication risks, benefits, costs, interactions, and alternatives were discussed as indicated. I advised him to contact the office if his condition worsens, changes or fails to improve as anticipated. He expressed understanding with the diagnosis(es) and plan.        Chayo Fontana, who was evaluated through a patient-initiated, synchronous (real-time) audio-video encounter, and/or his healthcare decision maker, is aware that it is a billable service, with coverage as determined by his insurance carrier. He provided verbal consent to proceed: Yes, and patient identification was verified. It was conducted pursuant to the emergency declaration under the [address] [place] [address] and the [employer] and [employer] General Act. A caregiver was present when appropriate. Ability to conduct physical exam was limited. I was in the office. The patient was at home.       Jason Alex MD

## 2020-12-24 NOTE — PROGRESS NOTES
ADVISED PATIENT OF THE FOLLOWING HEALTH MAINTAINCE DUE  Health Maintenance Due   Topic Date Due    Shingrix Vaccine Age 49> (1 of 2) 10/11/1999    Colorectal Cancer Screening Combo  10/11/1999    DTaP/Tdap/Td series (2 - Td) 04/06/2020      Chief Complaint   Patient presents with    Diarrhea     started 12/8, [facility]    Cerebrovascular Accident    Hypertension       1. Have you been to the ER, urgent care clinic since your last visit? Hospitalized since your last visit? [facility] 12[facility]    2. Have you seen or consulted any other health care providers outside of the [address] [place] [address] since your last visit? Include any DEXA scan, mammography  or colon screening. No    3. Do you have an Advance Directive on file? no    4. Do you have a DNR on file? no    Patient is accompanied by self I have received verbal consent from Joslyn Dodd to discuss any/all medical information while they are present in the room. Advance Care Planning 1/28/2020   Patient's Healthcare Decision Maker is: Legal Next of Kin   Confirm Advance Directive None   Patient Would Like to Complete Advance Directive No         Lona Youngblood #[id number] [facility], [address] [place] [id number]  Phone: [phone] Fax: [phone]        Patient reminded during visit to bring all medication bottles, OTC medications to all appointments.

## 2021-01-28 DIAGNOSIS — R19.7 DIARRHEA, UNSPECIFIED TYPE: ICD-10-CM

## 2021-01-28 NOTE — TELEPHONE ENCOUNTER
Requested Prescriptions     Pending Prescriptions Disp Refills    cholestyramine-aspartame (QUESTRAN LIGHT) 4 gram packet 40 Packet 1     Sig: Take 1 Packet by mouth two (2) times daily as needed for Diarrhea.    12/24/2020 02/09/2021  clotilde

## 2021-01-29 RX ORDER — CHOLESTYRAMINE 4 G/4.8G
4 POWDER, FOR SUSPENSION ORAL
Qty: 40 PACKET | Refills: 1 | Status: SHIPPED | OUTPATIENT
Start: 2021-01-29 | End: 2021-02-09 | Stop reason: ALTCHOICE

## 2021-02-02 ENCOUNTER — TELEPHONE (OUTPATIENT)
Dept: NEUROSURGERY | Age: 71
End: 2021-02-02

## 2021-02-02 DIAGNOSIS — I65.29 INTERNAL CAROTID ARTERY STENOSIS, UNSPECIFIED LATERALITY: Primary | ICD-10-CM

## 2021-02-09 ENCOUNTER — OFFICE VISIT (OUTPATIENT)
Dept: INTERNAL MEDICINE CLINIC | Age: 71
End: 2021-02-09
Payer: MEDICARE

## 2021-02-09 ENCOUNTER — HOSPITAL ENCOUNTER (OUTPATIENT)
Dept: VASCULAR SURGERY | Age: 71
Discharge: HOME | End: 2021-02-09
Attending: RADIOLOGY
Payer: MEDICARE

## 2021-02-09 VITALS
HEIGHT: 69 IN | TEMPERATURE: 97.8 F | HEART RATE: 80 BPM | DIASTOLIC BLOOD PRESSURE: 68 MMHG | OXYGEN SATURATION: 97 % | WEIGHT: 190.2 LBS | SYSTOLIC BLOOD PRESSURE: 124 MMHG | RESPIRATION RATE: 16 BRPM | BODY MASS INDEX: 28.17 KG/M2

## 2021-02-09 VITALS — SYSTOLIC BLOOD PRESSURE: 120 MMHG | DIASTOLIC BLOOD PRESSURE: 60 MMHG

## 2021-02-09 DIAGNOSIS — B35.6 JOCK ITCH: ICD-10-CM

## 2021-02-09 DIAGNOSIS — K52.9 CHRONIC DIARRHEA: ICD-10-CM

## 2021-02-09 DIAGNOSIS — I10 ESSENTIAL HYPERTENSION: Primary | ICD-10-CM

## 2021-02-09 DIAGNOSIS — I65.21 STENOSIS OF RIGHT INTERNAL CAROTID ARTERY: ICD-10-CM

## 2021-02-09 DIAGNOSIS — Z90.49 S/P CHOLECYSTECTOMY: ICD-10-CM

## 2021-02-09 DIAGNOSIS — I65.29 INTERNAL CAROTID ARTERY STENOSIS, UNSPECIFIED LATERALITY: ICD-10-CM

## 2021-02-09 LAB
LEFT CCA DIST DIAS: 14.2 CM/S
LEFT CCA DIST SYS: 90.4 CM/S
LEFT CCA PROX DIAS: 17.1 CM/S
LEFT CCA PROX SYS: 85.3 CM/S
LEFT ECA DIAS: 12.1 CM/S
LEFT ECA SYS: 112.5 CM/S
LEFT ICA DIST DIAS: 18.2 CM/S
LEFT ICA DIST SYS: 62.6 CM/S
LEFT ICA MID DIAS: 22.3 CM/S
LEFT ICA MID SYS: 78 CM/S
LEFT ICA PROX DIAS: 16.6 CM/S
LEFT ICA PROX SYS: 53.5 CM/S
LEFT ICA/CCA SYS: 0.9
LEFT VERTEBRAL DIAS: 8.5 CM/S
LEFT VERTEBRAL SYS: 36.7 CM/S
RIGHT CCA DIST DIAS: 13.8 CM/S
RIGHT CCA DIST SYS: 71.2 CM/S
RIGHT CCA PROX DIAS: 9.9 CM/S
RIGHT CCA PROX SYS: 80.3 CM/S
RIGHT ECA DIAS: 7.3 CM/S
RIGHT ECA SYS: 105 CM/S
RIGHT ICA DIST DIAS: 22.6 CM/S
RIGHT ICA DIST SYS: 82 CM/S
RIGHT ICA MID DIAS: 20.4 CM/S
RIGHT ICA MID SYS: 75.4 CM/S
RIGHT ICA PROX DIAS: 28.5 CM/S
RIGHT ICA PROX SYS: 114.3 CM/S
RIGHT ICA/CCA SYS: 1.6
RIGHT VERTEBRAL DIAS: 11 CM/S
RIGHT VERTEBRAL SYS: 50.2 CM/S

## 2021-02-09 PROCEDURE — G8417 CALC BMI ABV UP PARAM F/U: HCPCS | Performed by: INTERNAL MEDICINE

## 2021-02-09 PROCEDURE — 93880 EXTRACRANIAL BILAT STUDY: CPT

## 2021-02-09 PROCEDURE — G8536 NO DOC ELDER MAL SCRN: HCPCS | Performed by: INTERNAL MEDICINE

## 2021-02-09 PROCEDURE — G0463 HOSPITAL OUTPT CLINIC VISIT: HCPCS | Performed by: INTERNAL MEDICINE

## 2021-02-09 PROCEDURE — 3017F COLORECTAL CA SCREEN DOC REV: CPT | Performed by: INTERNAL MEDICINE

## 2021-02-09 PROCEDURE — G8427 DOCREV CUR MEDS BY ELIG CLIN: HCPCS | Performed by: INTERNAL MEDICINE

## 2021-02-09 PROCEDURE — G8754 DIAS BP LESS 90: HCPCS | Performed by: INTERNAL MEDICINE

## 2021-02-09 PROCEDURE — G8752 SYS BP LESS 140: HCPCS | Performed by: INTERNAL MEDICINE

## 2021-02-09 PROCEDURE — G8510 SCR DEP NEG, NO PLAN REQD: HCPCS | Performed by: INTERNAL MEDICINE

## 2021-02-09 PROCEDURE — 99214 OFFICE O/P EST MOD 30 MIN: CPT | Performed by: INTERNAL MEDICINE

## 2021-02-09 PROCEDURE — 1101F PT FALLS ASSESS-DOCD LE1/YR: CPT | Performed by: INTERNAL MEDICINE

## 2021-02-09 RX ORDER — CLOBETASOL PROPIONATE 0.5 MG/G
CREAM TOPICAL
Qty: 30 G | Refills: 0 | Status: SHIPPED | OUTPATIENT
Start: 2021-02-09 | End: 2021-11-29

## 2021-02-09 NOTE — PROGRESS NOTES
Avi Hill is a 69 y.o. male     Chief Complaint   Patient presents with    Hypertension    Follow-up     1. Have you been to the ER, urgent care clinic since your last visit? Hospitalized since your last visit? No    2. Have you seen or consulted any other health care providers outside of the [address] [place] [address] since your last visit? Include any pap smears or colon screening.  No     Visit Vitals  /68 (BP 1 Location: Right upper arm, BP Patient Position: Sitting, BP Cuff Size: Adult)   Pulse 80   Temp 97.8 °F (36.6 °C) (Oral)   Resp 16   Ht 5' 9\" (1.753 m)   Wt 190 lb 3.2 oz (86.3 kg)   SpO2 97%   BMI 28.09 kg/m²

## 2021-02-09 NOTE — PROGRESS NOTES
HISTORY OF PRESENT ILLNESS  Wilfredo Marino is a 69 y.o. male. Pt. comes in for f/u. Has a few chronic medical issues as documented. Has had diarrhea for over 2 months now. Had cholecystectomy many years ago. Denies any relation with food or medications. Questran did not help much. Lomotil helps some. Went to urgent care and tells me all studies were negative. Has appointment with GI coming up. Has not had a colonoscopy in many years. Denies hematochezia or melena. Denies any other GI symptoms. Continues to have a jock itch. No rash. Lotrisone cream and oral fluconazole did not help much. Vital signs are stable. History of CVA and carotid stenosis. Recent carotid Doppler looks stable. Denies any new neurological issues. Has chronic back and hip pain. Gait is slow but no falls. Denies any signs or symptoms of COVID-19. PMH/PSH/Allergies/Social History/medication list and most recent studies reviewed with patient. Tobacco use: No  Alcohol use: No  Reports compliance with medications and diet. Trying to be active physically to control weight. Reports no other new c/o. HPI    Review of Systems   Constitutional: Negative. HENT: Negative. Eyes: Negative. Negative for blurred vision. Respiratory: Negative. Negative for shortness of breath. Cardiovascular: Negative. Negative for chest pain and leg swelling. Gastrointestinal: Positive for diarrhea. Negative for abdominal pain, blood in stool, heartburn, melena, nausea and vomiting. Genitourinary: Negative. Negative for dysuria. Musculoskeletal: Positive for joint pain. Negative for falls. Skin: Positive for itching. Negative for rash. Neurological: Negative. Negative for dizziness, sensory change, focal weakness and headaches. Endo/Heme/Allergies: Negative. Negative for polydipsia. Psychiatric/Behavioral: Negative. Negative for depression. The patient is not nervous/anxious and does not have insomnia. Physical Exam  Vitals signs and nursing note reviewed. Constitutional:       General: He is not in acute distress. Appearance: He is well-developed. Comments: Pleasant man   HENT:      Head: Normocephalic and atraumatic. Mouth/Throat:      Mouth: Mucous membranes are moist.      Pharynx: Oropharynx is clear. Eyes:      General: No scleral icterus. Conjunctiva/sclera: Conjunctivae normal.   Neck:      Musculoskeletal: Normal range of motion and neck supple. Thyroid: No thyromegaly. Vascular: No JVD. Cardiovascular:      Rate and Rhythm: Normal rate and regular rhythm. Heart sounds: Normal heart sounds. No murmur. Pulmonary:      Effort: Pulmonary effort is normal. No respiratory distress. Breath sounds: Normal breath sounds. Abdominal:      General: Bowel sounds are normal. There is no distension. Palpations: Abdomen is soft. There is no mass. Tenderness: There is no abdominal tenderness. There is no guarding or rebound. Hernia: No hernia is present. Musculoskeletal:         General: Tenderness (Bilateral shoulders and hips, chronic) present. Right lower leg: No edema. Left lower leg: No edema. Comments: DJD   Lymphadenopathy:      Cervical: No cervical adenopathy. Skin:     General: Skin is warm and dry. Findings: No erythema or rash. Neurological:      Mental Status: He is alert and oriented to person, place, and time. Cranial Nerves: No cranial nerve deficit. Coordination: Coordination normal.   Psychiatric:         Behavior: Behavior normal.         ASSESSMENT and PLAN  Diagnoses and all orders for this visit:    1. Essential hypertension    2. Chronic diarrhea  See GI/Dr. Iraida Pena as scheduled  3. S/P cholecystectomy    4. Stenosis of right internal carotid artery    5.  Jock itch    Other orders  -     clobetasoL (TEMOVATE) 0.05 % topical cream; Apply  to affected area two (2) times daily as needed for Skin Irritation. Follow-up and Dispositions    · Return in about 6 months (around 8/9/2021).      lab results and schedule of future lab studies reviewed with patient  reviewed diet, exercise and weight control  reviewed medications and side effects in detail  F/u with other MD's as scheduled  May need dermatology referral if no better  COVID-19 precautions discussed with pt

## 2021-02-23 ENCOUNTER — VIRTUAL VISIT (OUTPATIENT)
Dept: NEUROSURGERY | Age: 71
End: 2021-02-23
Payer: MEDICARE

## 2021-02-23 DIAGNOSIS — I65.29 INTERNAL CAROTID ARTERY STENOSIS, UNSPECIFIED LATERALITY: Primary | ICD-10-CM

## 2021-02-23 PROCEDURE — G8427 DOCREV CUR MEDS BY ELIG CLIN: HCPCS | Performed by: RADIOLOGY

## 2021-02-23 PROCEDURE — G8417 CALC BMI ABV UP PARAM F/U: HCPCS | Performed by: RADIOLOGY

## 2021-02-23 PROCEDURE — G8536 NO DOC ELDER MAL SCRN: HCPCS | Performed by: RADIOLOGY

## 2021-02-23 PROCEDURE — 99213 OFFICE O/P EST LOW 20 MIN: CPT | Performed by: RADIOLOGY

## 2021-02-23 PROCEDURE — G8432 DEP SCR NOT DOC, RNG: HCPCS | Performed by: RADIOLOGY

## 2021-02-23 PROCEDURE — 1101F PT FALLS ASSESS-DOCD LE1/YR: CPT | Performed by: RADIOLOGY

## 2021-02-23 PROCEDURE — G8756 NO BP MEASURE DOC: HCPCS | Performed by: RADIOLOGY

## 2021-02-23 PROCEDURE — 99442 PR PHYS/QHP TELEPHONE EVALUATION 11-20 MIN: CPT | Performed by: RADIOLOGY

## 2021-02-23 PROCEDURE — 3017F COLORECTAL CA SCREEN DOC REV: CPT | Performed by: RADIOLOGY

## 2021-02-23 NOTE — PROGRESS NOTES
Phone call to patient in preparation for Virtual visit with provider. Name and  verified. [facility] hospital follow up presenting with carotid artery stenosis. Patient reports he is asymptomatic at this time. Denies headaches, dizziness, neck pain or stiffness, numbness or tingling and blurred or double vision. No acute problems reported. Patient reports he has a gastrology procedure on 2021.

## 2021-02-23 NOTE — PATIENT INSTRUCTIONS
Follow up with Dr Valentina Tyler in 1 year. February 2022. Call [phone] to schedule. Have Carotid Duplex done the beginning of February 2022. Call [phone] to schedule Duplex. May stop Plavix per Gastrologist recommendations prior to your procedure in April. Follow up with your PCP for Thyroid nodule noted on imaging. Follow up with Ophthalmology for eyelid issues. Continue Plavix 75 mg daily and Aspirin 325 mg daily. A Healthy Lifestyle: Care Instructions  Your Care Instructions     A healthy lifestyle can help you feel good, stay at a healthy weight, and have plenty of energy for both work and play. A healthy lifestyle is something you can share with your whole family. A healthy lifestyle also can lower your risk for serious health problems, such as high blood pressure, heart disease, and diabetes. You can follow a few steps listed below to improve your health and the health of your family. Follow-up care is a key part of your treatment and safety. Be sure to make and go to all appointments, and call your doctor if you are having problems. It's also a good idea to know your test results and keep a list of the medicines you take. How can you care for yourself at home? · Do not eat too much sugar, fat, or fast foods. You can still have dessert and treats now and then. The goal is moderation. · Start small to improve your eating habits. Pay attention to portion sizes, drink less juice and soda pop, and eat more fruits and vegetables. ? Eat a healthy amount of food. A 3-ounce serving of meat, for example, is about the size of a deck of cards. Fill the rest of your plate with vegetables and whole grains. ? Limit the amount of soda and sports drinks you have every day. Drink more water when you are thirsty. ? Eat at least 5 servings of fruits and vegetables every day.  It may seem like a lot, but it is not hard to reach this goal. A serving or helping is 1 piece of fruit, 1 cup of vegetables, or 2 cups of leafy, raw vegetables. Have an apple or some carrot sticks as an afternoon snack instead of a candy bar. Try to have fruits and/or vegetables at every meal.  · Make exercise part of your daily routine. You may want to start with simple activities, such as walking, bicycling, or slow swimming. Try to be active 30 to 60 minutes every day. You do not need to do all 30 to 60 minutes all at once. For example, you can exercise 3 times a day for 10 or 20 minutes. Moderate exercise is safe for most people, but it is always a good idea to talk to your doctor before starting an exercise program.  · Keep moving. [employer] the lawn, work in the garden, or [employer]. Take the stairs instead of the elevator at work. · If you smoke, quit. People who smoke have an increased risk for heart attack, stroke, cancer, and other lung illnesses. Quitting is hard, but there are ways to boost your chance of quitting tobacco for good. ? Use nicotine gum, patches, or lozenges. ? Ask your doctor about stop-smoking programs and medicines. ? Keep trying. In addition to reducing your risk of diseases in the future, you will notice some benefits soon after you stop using tobacco. If you have shortness of breath or asthma symptoms, they will likely get better within a few weeks after you quit. · Limit how much alcohol you drink. Moderate amounts of alcohol (up to 2 drinks a day for men, 1 drink a day for women) are okay. But drinking too much can lead to liver problems, high blood pressure, and other health problems. Family health  If you have a family, there are many things you can do together to improve your health. · Eat meals together as a family as often as possible. · Eat healthy foods. This includes fruits, vegetables, lean meats and dairy, and whole grains. · Include your family in your fitness plan.  Most people think of activities such as jogging or tennis as the way to fitness, but there are many ways you and your family can be more active. Anything that makes you breathe hard and gets your heart pumping is exercise. Here are some tips:  ? Walk to do errands or to take your child to school or the bus.  ? Go for a family bike ride after dinner instead of watching TV. Where can you learn more? Go to http://www.gray.com/  Enter V275 in the search box to learn more about \"A Healthy Lifestyle: Care Instructions. \"  Current as of: January 31, 2020               Content Version: 12.6  © 1723-2208 [employer]. Care instructions adapted under license by [employer] (which disclaims liability or warranty for this information). If you have questions about a medical condition or this instruction, always ask your healthcare professional. [address] any warranty or liability for your use of this information.

## 2021-02-25 NOTE — PROGRESS NOTES
Neurointerventional Surgery Clinic Note    Abe Chaney is a 69 y.o. male who was seen by telephone on 2/23/2021. Consent: Abe Chaney, who was seen by telephone, and/or his healthcare decision maker, is aware that this patient-initiated, Telehealth encounter on 2/23/2021 is a billable service, with coverage as determined by his insurance carrier. He is aware that he may receive a bill and has provided verbal consent to proceed: Yes. Assessment & Plan:   69 y.o. male with history of multiple medical problems, including hypertension, stroke, chronic pain, tobacco abuse, and spinal stenosis who presents for follow-up imaging of carotid stenosis. Patient has not had any recurrent amaurosis type visual symptoms that brought him to the hospital 1 year ago, however he reports recent gastrointestinal issues and recurrent problems with his left eyelid. Patient has an endoscopy procedure scheduled. It is okay for him to stop Plavix for this procedure. He also plans to visit his ophthalmologist.    Otherwise, plan is for patient to continue aspirin and Plavix daily. We will obtain a follow-up duplex scan in 1 year. Given the finding of a thyroid nodule on the carotid duplex, I advised him to follow-up with his PCP Dr. Nicholas Estes. Patient expressed understanding and is in agreement with this plan. Plan:  -Continue dual antiplatelet therapy (okay to stop Plavix temporarily for upcoming planned endoscopy procedure)  -Follow-up carotid duplex scan in 1 year  -Patient to follow-up with his ophthalmologist to evaluate his left eyelid issues, and patient to follow-up with his PCP regarding the thyroid nodule seen on the carotid duplex scan    I spent at least 20 minutes on this visit with this established patient.     Subjective:   Abe Chaney is a 69 y.o. male   With history of multiple medical problems, including hypertension, stroke, chronic pain, tobacco abuse, and spinal stenosis who presents for follow-up imaging of carotid stenosis. Patient states that he has been feeling \"up and down. \"  Upon further questioning, he describes that he is suffering from a \"gastro problem\" for which he has been having diarrhea since December. Although he has had recurrent visual problems in the last couple of weeks, these of the same visual problems that he experienced before he had his eyelid surgery. He reports that his left eyelid closes spontaneously and he cannot see out of that eye only because the eyelid is closed. He plans to see his ophthalmologist.  However, he does not report any recurrent vision loss similar to the episode which brought him to the hospital a year ago nor any visual blurriness. He is still taking aspirin and Plavix daily. He denies headaches, numbness and tingling, weakness, nausea and vomiting, dizziness, problems with balance and coordination, and speech problems. Follow-up carotid duplex scan performed 2/9/2021 demonstrates less than 50% stenosis of the bilateral carotid arteries. A thyroid nodule was incidentally identified. Chief Complaint: Carotid Artery Stenosis (New hospital follow up)      Prior to Admission medications    Medication Sig Start Date End Date Taking? Authorizing Provider   OTHER Reports taking Prevagen 60 mg daily. Yes Provider, Historical   clobetasoL (TEMOVATE) 0.05 % topical cream Apply  to affected area two (2) times daily as needed for Skin Irritation. 2/9/21  Yes Enrique Mora,    clopidogreL (PLAVIX) 75 mg tab TAKE 1 TABLET BY MOUTH DAILY 12/10/20  Yes Clayton Rodriguez,    atorvastatin (LIPITOR) 80 mg tablet TAKE 1 TABLET BY MOUTH DAILY 12/10/20  Yes Clayton Rodriguez,    losartan-hydroCHLOROthiazide (HYZAAR) 100-25 mg per tablet TAKE 1 TABLET BY MOUTH EVERY DAY 11/6/20  Yes José Luis Brock NP   aspirin (ASPIRIN) 325 mg tablet Take 325 mg by mouth daily.    Yes Provider, Historical   naloxone (Narcan) 4 mg/actuation nasal spray SEE NOTES 12/2/19  Yes Provider, Historical   cholecalciferol (VITAMIN D3) 25 mcg (1,000 unit) cap Take 2,000 Units by mouth daily. 11/30/19  Yes Provider, Historical   albuterol (PROVENTIL HFA, VENTOLIN HFA, PROAIR HFA) 90 mcg/actuation inhaler Take 2 Puffs by inhalation every six (6) hours as needed for Wheezing. Yes Provider, Historical   fluticasone propionate (FLONASE ALLERGY RELIEF) 50 mcg/actuation nasal spray 2 Sprays by Both Nostrils route daily. Yes Provider, Historical   therapeutic multivitamin (THERAGRAN) tablet Take 1 Tab by mouth daily. 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    Internal carotid artery stenosis I65.29    Essential hypertension I10    Chronic pain of both shoulders M25.511, G89.29, M25.512    Chronic hip pain, bilateral M25.551, M25.552, G89.29    Smoker F17.200    Ejection fraction < 50% R94.30    Droopy eyelid, bilateral H02.403    Hyperglycemia R73.9    Spinal stenosis of lumbar region with neurogenic claudication M48.062    Jock itch B35.6    Chronic diarrhea K52.9    S/P cholecystectomy Z90.49     Patient Active Problem List    Diagnosis Date Noted    Chronic diarrhea 02/09/2021    S/P cholecystectomy 02/09/2021    Spinal stenosis of lumbar region with neurogenic claudication 10/06/2020    Jock itch 10/06/2020    Essential hypertension 06/09/2020    Chronic pain of both shoulders 06/09/2020    Chronic hip pain, bilateral 06/09/2020    Smoker 06/09/2020    Ejection fraction < 50% 06/09/2020    Droopy eyelid, bilateral 06/09/2020    Hyperglycemia 06/09/2020    Amaurosis fugax 01/29/2020    Internal carotid artery stenosis 01/29/2020    CVA (cerebral vascular accident) ([facility] 01/27/2020     Current Outpatient Medications   Medication Sig Dispense Refill    OTHER Reports taking Prevagen 60 mg daily.       clobetasoL (TEMOVATE) 0.05 % topical cream Apply  to affected area two (2) times daily as needed for Skin Irritation. 30 g 0    clopidogreL (PLAVIX) 75 mg tab TAKE 1 TABLET BY MOUTH DAILY 90 Tab 1    atorvastatin (LIPITOR) 80 mg tablet TAKE 1 TABLET BY MOUTH DAILY 90 Tab 1    losartan-hydroCHLOROthiazide (HYZAAR) 100-25 mg per tablet TAKE 1 TABLET BY MOUTH EVERY DAY 90 Tab 1    aspirin (ASPIRIN) 325 mg tablet Take 325 mg by mouth daily.  naloxone (Narcan) 4 mg/actuation nasal spray SEE NOTES      cholecalciferol (VITAMIN D3) 25 mcg (1,000 unit) cap Take 2,000 Units by mouth daily.  albuterol (PROVENTIL HFA, VENTOLIN HFA, PROAIR HFA) 90 mcg/actuation inhaler Take 2 Puffs by inhalation every six (6) hours as needed for Wheezing.  fluticasone propionate (FLONASE ALLERGY RELIEF) 50 mcg/actuation nasal spray 2 Sprays by Both Nostrils route daily.  therapeutic multivitamin (THERAGRAN) tablet Take 1 Tab by mouth daily. No Known Allergies  Past Medical History:   Diagnosis Date    Hypertension      Past Surgical History:   Procedure Laterality Date    HX CHOLECYSTECTOMY       Family History   Problem Relation Age of Onset    Cancer Father      Social History     Tobacco Use    Smoking status: Current Every Day Smoker     Packs/day: 0.50     Types: Cigarettes    Smokeless tobacco: Never Used   Substance Use Topics    Alcohol use: Never     Frequency: Never       ROS: Pertinent ROS included in HPI    Objective:   Vital Signs: (As obtained by patient/caregiver at home)  There were no vitals taken for this visit. Physical exam could not be performed. We discussed the expected course, resolution and complications of the diagnosis(es) in detail. Medication risks, benefits, costs, interactions, and alternatives were discussed as indicated. I advised him to contact the office if his condition worsens, changes or fails to improve as anticipated.  He expressed understanding with the diagnosis(es) and plan.       Charlene Flores is a 69 y.o. male who was evaluated by a video visit encounter for concerns as above. Patient identification was verified prior to start of the visit. A caregiver was present when appropriate. Due to this being a TeleHealth encounter (During [facility]- public health emergency), evaluation of the following organ systems was limited: Vitals/Constitutional/EENT/Resp/CV/GI//MS/Neuro/Skin/Heme-Lymph-Imm. Pursuant to the emergency declaration under the [address] [place] [address], [facility] waiver authority and the [employer] Act, this Virtual  Visit was conducted, with patient's (and/or legal guardian's) consent, to reduce the patient's risk of exposure to COVID-19 and provide necessary medical care. Services were provided through a telephonic discussion virtually to substitute for in-person clinic visit. Patient was located at home, and provider was located in office. This visit was completed virtually using the telephone.       Luz Soto MD

## 2021-02-26 ENCOUNTER — VIRTUAL VISIT (OUTPATIENT)
Dept: INTERNAL MEDICINE CLINIC | Age: 71
End: 2021-02-26
Payer: MEDICARE

## 2021-02-26 DIAGNOSIS — I65.23 STENOSIS OF BOTH INTERNAL CAROTID ARTERIES: ICD-10-CM

## 2021-02-26 DIAGNOSIS — M25.552 CHRONIC HIP PAIN, BILATERAL: ICD-10-CM

## 2021-02-26 DIAGNOSIS — M25.551 CHRONIC HIP PAIN, BILATERAL: ICD-10-CM

## 2021-02-26 DIAGNOSIS — G89.29 CHRONIC HIP PAIN, BILATERAL: ICD-10-CM

## 2021-02-26 DIAGNOSIS — F17.200 SMOKER: ICD-10-CM

## 2021-02-26 DIAGNOSIS — E04.1 THYROID NODULE: Primary | ICD-10-CM

## 2021-02-26 DIAGNOSIS — I10 ESSENTIAL HYPERTENSION: ICD-10-CM

## 2021-02-26 DIAGNOSIS — E78.00 PURE HYPERCHOLESTEROLEMIA: ICD-10-CM

## 2021-02-26 DIAGNOSIS — Z86.69 HISTORY OF AMAUROSIS FUGAX: ICD-10-CM

## 2021-02-26 PROCEDURE — G0463 HOSPITAL OUTPT CLINIC VISIT: HCPCS | Performed by: INTERNAL MEDICINE

## 2021-02-26 PROCEDURE — G8510 SCR DEP NEG, NO PLAN REQD: HCPCS | Performed by: INTERNAL MEDICINE

## 2021-02-26 PROCEDURE — G8756 NO BP MEASURE DOC: HCPCS | Performed by: INTERNAL MEDICINE

## 2021-02-26 PROCEDURE — 3017F COLORECTAL CA SCREEN DOC REV: CPT | Performed by: INTERNAL MEDICINE

## 2021-02-26 PROCEDURE — G8417 CALC BMI ABV UP PARAM F/U: HCPCS | Performed by: INTERNAL MEDICINE

## 2021-02-26 PROCEDURE — 99214 OFFICE O/P EST MOD 30 MIN: CPT | Performed by: INTERNAL MEDICINE

## 2021-02-26 PROCEDURE — 1101F PT FALLS ASSESS-DOCD LE1/YR: CPT | Performed by: INTERNAL MEDICINE

## 2021-02-26 PROCEDURE — G8536 NO DOC ELDER MAL SCRN: HCPCS | Performed by: INTERNAL MEDICINE

## 2021-02-26 PROCEDURE — G8427 DOCREV CUR MEDS BY ELIG CLIN: HCPCS | Performed by: INTERNAL MEDICINE

## 2021-02-26 RX ORDER — ALBUTEROL SULFATE 90 UG/1
2 AEROSOL, METERED RESPIRATORY (INHALATION)
Qty: 1 INHALER | Refills: 5 | Status: SHIPPED | OUTPATIENT
Start: 2021-02-26 | End: 2022-02-03 | Stop reason: SDUPTHER

## 2021-02-26 NOTE — PROGRESS NOTES
Results for orders placed or performed during the hospital encounter of 02/09/21   DUPLEX CAROTID BILATERAL   Result Value Ref Range    Right cca dist sys 71.2 cm/s    Right CCA dist lennon 13.8 cm/s    Right CCA prox sys 80.3 cm/s    Right CCA prox lennon 9.9 cm/s    Right eca sys 105.0 cm/s    RIGHT EXTERNAL CAROTID ARTERY D 7.30 cm/s    Right ICA dist sys 82.0 cm/s    Right ICA dist lennon 22.6 cm/s    Right ICA mid sys 75.4 cm/s    Right ICA mid lennon 20.4 cm/s    Right ICA prox sys 114.3 cm/s    Right ICA prox lennon 28.5 cm/s    Right vertebral sys 50.2 cm/s    RIGHT VERTEBRAL ARTERY D 11.00 cm/s    Right ICA/CCA sys 1.6     Left CCA dist sys 90.4 cm/s    Left CCA dist lennon 14.2 cm/s    Left CCA prox sys 85.3 cm/s    Left CCA prox lennon 17.1 cm/s    Left ECA sys 112.5 cm/s    LEFT EXTERNAL CAROTID ARTERY D 12.10 cm/s    Left ICA dist sys 62.6 cm/s    Left ICA dist lennon 18.2 cm/s    Left ICA mid sys 78.0 cm/s    Left ICA mid lennon 22.3 cm/s    Left ICA prox sys 53.5 cm/s    Left ICA prox lennon 16.6 cm/s    Left vertebral sys 36.7 cm/s    LEFT VERTEBRAL ARTERY D 8.50 cm/s    Left ICA/CCA sys 0.90        Health Maintenance Due   Topic Date Due    COVID-19 Vaccine (1 of 2) 10/11/1966    Shingrix Vaccine Age 50> (1 of 2) 10/11/1999    Colorectal Cancer Screening Combo  10/11/1999    DTaP/Tdap/Td series (2 - Td) 04/06/2020       No chief complaint on file. 1. Have you been to the ER, urgent care clinic since your last visit? Hospitalized since your last visit? No    2. Have you seen or consulted any other health care providers outside of the [address] [place] [address] since your last visit? Include any pap smears or colon screening. No    3) Do you have an Advance Directive on file? no    4) Are you interested in receiving information on Advance Directives?  NO      Patient is accompanied by self I have received verbal consent from Avi Hill to discuss any/all medical information while they are present in the room.

## 2021-02-26 NOTE — PROGRESS NOTES
Abe Chaney (: 1950) is a 69 y.o. male, established patient, here for evaluation of the following chief complaint(s):   Labs (Discuss Labs)       ASSESSMENT/PLAN:  1. Thyroid nodule  -     T4, FREE; Future  -     TSH 3RD GENERATION; Future    2. Essential hypertension    3. Stenosis of both internal carotid arteries    4. Chronic hip pain, bilateral    5. Smoker    6. Pure hypercholesterolemia  -     LIPID PANEL; Future  -     METABOLIC PANEL, BASIC; Future        Return if symptoms worsen or fail to improve. SUBJECTIVE/OBJECTIVE:  Pt. is seen virtually for f/u. Has a few chronic medical issues as documented. History of amaurosis fugax and carotid stenosis. Had a recent carotid Doppler by vascular MD.  I reviewed records. Stenosis is less than 50%. There was a incidental finding of left thyroid nodule. Denies any related symptoms. Reports vital signs been stable. Medications for HTN and HLD. Continues to smoke daily. Uses albuterol as needed. Taking precautions for Covid 19. Denies any related signs or symptoms including fever, cough, SOB or CP. Due for repeat labs. PMH/PSH/Allergies/Social History/medication list and most recent studies reviewed with patient. In 2020 LDL was 116. Other labs were stable. Tobacco use: Half a pack daily  Alcohol use: Social    Reports compliance with medications and diet. Trying to be active physically to control weight. Reports no other new c/o.     Plan:  Continue current medications  Repeat labs including TFTs  Watch diet and remain active physically  Follow-up with other MDs/specialists as scheduled  Advised pt strongly to stop smoking   COVID-19 precautions discussed with pt  Follow-up 6 months or as needed        Physical Exam    [INSTRUCTIONS:  \"[x]\" Indicates a positive item  \"[]\" Indicates a negative item  -- DELETE ALL ITEMS NOT EXAMINED]    Constitutional: [x] Appears well-developed and well-nourished [x] No apparent distress      [] Abnormal -     Mental status: [x] Alert and awake  [x] Oriented to person/place/time [x] Able to follow commands    [] Abnormal -     Eyes:   EOM    [x]  Normal    [] Abnormal -   Sclera  [x]  Normal    [] Abnormal -          Discharge [x]  None visible   [] Abnormal -     HENT: [x] Normocephalic, atraumatic  [] Abnormal -   [x] Mouth/Throat: Mucous membranes are moist    External Ears [x] Normal  [] Abnormal -    Neck: [x] No visualized mass [] Abnormal -     Pulmonary/Chest: [x] Respiratory effort normal   [x] No visualized signs of difficulty breathing or respiratory distress        [] Abnormal -      Musculoskeletal:   [x] Normal gait with no signs of ataxia         [x] Normal range of motion of neck        [] Abnormal -     Neurological:        [x] No Facial Asymmetry (Cranial nerve 7 motor function) (limited exam due to video visit)          [x] No gaze palsy        [] Abnormal -          Skin:        [x] No significant exanthematous lesions or discoloration noted on facial skin         [] Abnormal -            Psychiatric:       [x] Normal Affect [] Abnormal -        [x] No Hallucinations    Other pertinent observable physical exam findings:-        On this date 02/26/21 I have spent 25 minutes reviewing previous notes, test results and face to face (virtual) with the patient discussing the diagnosis and importance of compliance with the treatment plan as well as documenting on the day of the visit. Ivonne Mora is being evaluated by a Virtual Visit (video visit) encounter to address concerns as mentioned above. A caregiver was present when appropriate. Due to this being a TeleHealth encounter (During BEIJ[facility] public [facility] emergency), evaluation of the following organ systems was limited: Vitals/Constitutional/EENT/Resp/CV/GI//MS/Neuro/Skin/Heme-Lymph-Imm.   Pursuant to the emergency declaration under the [address], 1135 waiver authority and the Coronavirus Preparedness and Response Supplemental Appropriations Act, this Virtual Visit was conducted with patient's (and/or legal guardian's) consent, to reduce the patient's risk of exposure to COVID-19 and provide necessary medical care. The patient (and/or legal guardian) has also been advised to contact this office for worsening conditions or problems, and seek emergency medical treatment and/or call 911 if deemed necessary. Patient identification was verified at the start of the visit: YES    Services were provided through a video synchronous discussion virtually to substitute for in-person clinic visit. Patient was located at home and provider was located in office or at home. An electronic signature was used to authenticate this note.   -- Kamala Began, DO

## 2021-02-27 LAB
BUN SERPL-MCNC: 9 MG/DL (ref 8–27)
BUN/CREAT SERPL: 11 (ref 10–24)
CALCIUM SERPL-MCNC: 10.1 MG/DL (ref 8.6–10.2)
CHLORIDE SERPL-SCNC: 97 MMOL/L (ref 96–106)
CHOLEST SERPL-MCNC: 130 MG/DL (ref 100–199)
CO2 SERPL-SCNC: 26 MMOL/L (ref 20–29)
CREAT SERPL-MCNC: 0.79 MG/DL (ref 0.76–1.27)
GLUCOSE SERPL-MCNC: 91 MG/DL (ref 65–99)
HDLC SERPL-MCNC: 44 MG/DL
INTERPRETATION, 910389: NORMAL
LDLC SERPL CALC-MCNC: 55 MG/DL (ref 0–99)
POTASSIUM SERPL-SCNC: 4.1 MMOL/L (ref 3.5–5.2)
SODIUM SERPL-SCNC: 140 MMOL/L (ref 134–144)
T4 FREE SERPL-MCNC: 1.34 NG/DL (ref 0.82–1.77)
TRIGL SERPL-MCNC: 185 MG/DL (ref 0–149)
TSH SERPL DL<=0.005 MIU/L-ACNC: 1.17 UIU/ML (ref 0.45–4.5)
VLDLC SERPL CALC-MCNC: 31 MG/DL (ref 5–40)

## 2021-03-03 NOTE — PROGRESS NOTES
Cholesterol levels improved, triglycerides remain elevated. Watch diet for foods high in sugar. Otherwise, stable labs.

## 2021-04-09 RX ORDER — ATORVASTATIN CALCIUM 80 MG/1
80 TABLET, FILM COATED ORAL
COMMUNITY
End: 2021-06-03

## 2021-04-09 RX ORDER — CLOPIDOGREL BISULFATE 75 MG/1
75 TABLET ORAL
COMMUNITY
End: 2021-06-03

## 2021-04-09 RX ORDER — ACETAMINOPHEN 500 MG
1000 TABLET ORAL
COMMUNITY

## 2021-04-12 ENCOUNTER — HOSPITAL ENCOUNTER (OUTPATIENT)
Dept: PREADMISSION TESTING | Age: 71
Discharge: HOME | End: 2021-04-12
Payer: MEDICARE

## 2021-04-12 ENCOUNTER — TRANSCRIBE ORDER (OUTPATIENT)
Dept: REGISTRATION | Age: 71
End: 2021-04-12

## 2021-04-12 DIAGNOSIS — Z01.812 PRE-PROCEDURE LAB EXAM: ICD-10-CM

## 2021-04-12 DIAGNOSIS — Z01.812 PRE-PROCEDURE LAB EXAM: Primary | ICD-10-CM

## 2021-04-12 PROCEDURE — U0003 INFECTIOUS AGENT DETECTION BY NUCLEIC ACID (DNA OR RNA); SEVERE ACUTE RESPIRATORY SYNDROME CORONAVIRUS 2 (SARS-COV-2) (CORONAVIRUS DISEASE [COVID-19]), AMPLIFIED PROBE TECHNIQUE, MAKING USE OF HIGH THROUGHPUT TECHNOLOGIES AS DESCRIBED BY CMS-2020-01-R: HCPCS

## 2021-04-14 LAB — SARS-COV-2, COV2NT: NOT DETECTED

## 2021-04-16 ENCOUNTER — ANESTHESIA (OUTPATIENT)
Dept: ENDOSCOPY | Age: 71
End: 2021-04-16
Payer: MEDICARE

## 2021-04-16 ENCOUNTER — HOSPITAL ENCOUNTER (OUTPATIENT)
Age: 71
Setting detail: OUTPATIENT SURGERY
Discharge: HOME | End: 2021-04-16
Attending: INTERNAL MEDICINE | Admitting: INTERNAL MEDICINE
Payer: MEDICARE

## 2021-04-16 ENCOUNTER — ANESTHESIA EVENT (OUTPATIENT)
Dept: ENDOSCOPY | Age: 71
End: 2021-04-16
Payer: MEDICARE

## 2021-04-16 VITALS
HEIGHT: 70 IN | TEMPERATURE: 97.1 F | BODY MASS INDEX: 29.92 KG/M2 | DIASTOLIC BLOOD PRESSURE: 65 MMHG | HEART RATE: 62 BPM | OXYGEN SATURATION: 99 % | WEIGHT: 209 LBS | SYSTOLIC BLOOD PRESSURE: 119 MMHG | RESPIRATION RATE: 15 BRPM

## 2021-04-16 PROCEDURE — 74011000250 HC RX REV CODE- 250: Performed by: NURSE ANESTHETIST, CERTIFIED REGISTERED

## 2021-04-16 PROCEDURE — 76040000007: Performed by: INTERNAL MEDICINE

## 2021-04-16 PROCEDURE — 77030021593 HC FCPS BIOP ENDOSC BSC -A: Performed by: INTERNAL MEDICINE

## 2021-04-16 PROCEDURE — 76060000032 HC ANESTHESIA 0.5 TO 1 HR: Performed by: INTERNAL MEDICINE

## 2021-04-16 PROCEDURE — 88305 TISSUE EXAM BY PATHOLOGIST: CPT

## 2021-04-16 PROCEDURE — 2709999900 HC NON-CHARGEABLE SUPPLY: Performed by: INTERNAL MEDICINE

## 2021-04-16 PROCEDURE — 74011250636 HC RX REV CODE- 250/636: Performed by: NURSE ANESTHETIST, CERTIFIED REGISTERED

## 2021-04-16 PROCEDURE — 77030029384 HC SNR POLYP CAPTVR II BSC -B: Performed by: INTERNAL MEDICINE

## 2021-04-16 RX ORDER — SODIUM CHLORIDE 0.9 % (FLUSH) 0.9 %
5-40 SYRINGE (ML) INJECTION EVERY 8 HOURS
Status: DISCONTINUED | OUTPATIENT
Start: 2021-04-16 | End: 2021-04-16 | Stop reason: HOSPADM

## 2021-04-16 RX ORDER — FLUMAZENIL 0.1 MG/ML
0.2 INJECTION INTRAVENOUS
Status: DISCONTINUED | OUTPATIENT
Start: 2021-04-16 | End: 2021-04-16 | Stop reason: HOSPADM

## 2021-04-16 RX ORDER — PROPOFOL 10 MG/ML
INJECTION, EMULSION INTRAVENOUS AS NEEDED
Status: DISCONTINUED | OUTPATIENT
Start: 2021-04-16 | End: 2021-04-16 | Stop reason: HOSPADM

## 2021-04-16 RX ORDER — NALOXONE HYDROCHLORIDE 0.4 MG/ML
0.4 INJECTION, SOLUTION INTRAMUSCULAR; INTRAVENOUS; SUBCUTANEOUS
Status: DISCONTINUED | OUTPATIENT
Start: 2021-04-16 | End: 2021-04-16 | Stop reason: HOSPADM

## 2021-04-16 RX ORDER — ATROPINE SULFATE 0.1 MG/ML
0.5 INJECTION INTRAVENOUS
Status: DISCONTINUED | OUTPATIENT
Start: 2021-04-16 | End: 2021-04-16 | Stop reason: HOSPADM

## 2021-04-16 RX ORDER — DEXTROMETHORPHAN/PSEUDOEPHED 2.5-7.5/.8
1.2 DROPS ORAL
Status: DISCONTINUED | OUTPATIENT
Start: 2021-04-16 | End: 2021-04-16 | Stop reason: HOSPADM

## 2021-04-16 RX ORDER — MIDAZOLAM HYDROCHLORIDE 1 MG/ML
.25-1 INJECTION, SOLUTION INTRAMUSCULAR; INTRAVENOUS
Status: DISCONTINUED | OUTPATIENT
Start: 2021-04-16 | End: 2021-04-16 | Stop reason: HOSPADM

## 2021-04-16 RX ORDER — FENTANYL CITRATE 50 UG/ML
100 INJECTION, SOLUTION INTRAMUSCULAR; INTRAVENOUS
Status: DISCONTINUED | OUTPATIENT
Start: 2021-04-16 | End: 2021-04-16 | Stop reason: HOSPADM

## 2021-04-16 RX ORDER — EPINEPHRINE 0.1 MG/ML
1 INJECTION INTRACARDIAC; INTRAVENOUS
Status: DISCONTINUED | OUTPATIENT
Start: 2021-04-16 | End: 2021-04-16 | Stop reason: HOSPADM

## 2021-04-16 RX ORDER — SODIUM CHLORIDE 9 MG/ML
INJECTION, SOLUTION INTRAVENOUS
Status: DISCONTINUED | OUTPATIENT
Start: 2021-04-16 | End: 2021-04-16 | Stop reason: HOSPADM

## 2021-04-16 RX ORDER — SODIUM CHLORIDE 9 MG/ML
50 INJECTION, SOLUTION INTRAVENOUS CONTINUOUS
Status: DISCONTINUED | OUTPATIENT
Start: 2021-04-16 | End: 2021-04-16 | Stop reason: HOSPADM

## 2021-04-16 RX ORDER — LIDOCAINE HYDROCHLORIDE 20 MG/ML
INJECTION, SOLUTION EPIDURAL; INFILTRATION; INTRACAUDAL; PERINEURAL AS NEEDED
Status: DISCONTINUED | OUTPATIENT
Start: 2021-04-16 | End: 2021-04-16 | Stop reason: HOSPADM

## 2021-04-16 RX ORDER — PHENYLEPHRINE HCL IN 0.9% NACL 0.4MG/10ML
SYRINGE (ML) INTRAVENOUS AS NEEDED
Status: DISCONTINUED | OUTPATIENT
Start: 2021-04-16 | End: 2021-04-16 | Stop reason: HOSPADM

## 2021-04-16 RX ORDER — SODIUM CHLORIDE 0.9 % (FLUSH) 0.9 %
5-40 SYRINGE (ML) INJECTION AS NEEDED
Status: DISCONTINUED | OUTPATIENT
Start: 2021-04-16 | End: 2021-04-16 | Stop reason: HOSPADM

## 2021-04-16 RX ADMIN — PROPOFOL 30 MG: 10 INJECTION, EMULSION INTRAVENOUS at 12:46

## 2021-04-16 RX ADMIN — Medication 80 MCG: at 12:55

## 2021-04-16 RX ADMIN — PROPOFOL 30 MG: 10 INJECTION, EMULSION INTRAVENOUS at 12:40

## 2021-04-16 RX ADMIN — Medication 80 MCG: at 12:59

## 2021-04-16 RX ADMIN — PROPOFOL 20 MG: 10 INJECTION, EMULSION INTRAVENOUS at 12:54

## 2021-04-16 RX ADMIN — PROPOFOL 40 MG: 10 INJECTION, EMULSION INTRAVENOUS at 12:58

## 2021-04-16 RX ADMIN — PROPOFOL 30 MG: 10 INJECTION, EMULSION INTRAVENOUS at 12:38

## 2021-04-16 RX ADMIN — SODIUM CHLORIDE: 900 INJECTION, SOLUTION INTRAVENOUS at 12:05

## 2021-04-16 RX ADMIN — SODIUM CHLORIDE: 900 INJECTION, SOLUTION INTRAVENOUS at 12:58

## 2021-04-16 RX ADMIN — LIDOCAINE HYDROCHLORIDE 50 MG: 20 INJECTION, SOLUTION EPIDURAL; INFILTRATION; INTRACAUDAL; PERINEURAL at 12:35

## 2021-04-16 RX ADMIN — PROPOFOL 100 MG: 10 INJECTION, EMULSION INTRAVENOUS at 12:35

## 2021-04-16 RX ADMIN — Medication 80 MCG: at 12:45

## 2021-04-16 RX ADMIN — PROPOFOL 50 MG: 10 INJECTION, EMULSION INTRAVENOUS at 12:36

## 2021-04-16 RX ADMIN — Medication 80 MCG: at 12:52

## 2021-04-16 RX ADMIN — Medication 80 MCG: at 12:40

## 2021-04-16 RX ADMIN — PROPOFOL 30 MG: 10 INJECTION, EMULSION INTRAVENOUS at 12:49

## 2021-04-16 RX ADMIN — PROPOFOL 30 MG: 10 INJECTION, EMULSION INTRAVENOUS at 12:43

## 2021-04-16 NOTE — DISCHARGE INSTRUCTIONS
[facility].  [address] [place] [address]  [id number]  1950    It was my pleasure seeing you for your procedure. You will also receive a summary report with the findings from this procedure and any further recommendations. If you had polyps removed or biopsies taken during your procedure, you will receive a separate letter from me within the next 2 weeks. If you don't receive this letter or if you have any questions, please call my office [phone]. Please take note of the post procedure instructions listed below. Neo Wishes,    Dr. Bello Doing    These instructions give you information on caring for yourself after your procedure. Call your doctor if you have any problems or questions after your procedure. HOME CARE  · Walk if you have belly cramping or gas. Walking will help get rid of the air and reduce the bloated feeling in your belly (abdomen). · Your IV site (where you received drugs) may be tender to touch. Place warm towels on the site; keep your arm up on two pillows if you have any swelling or soreness in the area. · You may shower. ACTIVITY:  · Take frequent rest periods and move at a slower pace for the next 24 hours. .  · You may resume your regular activity tomorrow if you are feeling back to normal.  · Do not drive or ride a bicycle for at least 24 hours (because of the medicine (anesthesia) used during the test). · Do not sign any important legal documents or use or operate any machinery for 24 hours  · Do not take sleeping medicines/nerve drugs for 24 hours unless the doctor tells you. · You can return to work/school tomorrow unless otherwise instructed. NUTRITION:  · Drink plenty of fluids to keep your pee (urine) clear or pale yellow  · Begin with a light meal and progress to your normal diet.  Heavy or fried foods are harder to digest and may make you feel sick to your stomach (nauseated). · Once you are feeling back to normal, you may resume your normal diet as instructed by your doctor. · Avoid alcoholic beverages for 24 hours or as instructed. IF YOU HAD BIOPSIES TAKEN OR POLYPS REMOVED DURING THE PROCEDURE:  · For the next 7 days, avoid all non-steroidal antiinflammatory medications such as Ibuprofen, Motrin, Advil, Alleve, Jenna-seltzer, Goody's powder, BC powder. · If you do not have an heart condition that requires you to take a daily aspirin, you should avoid taking aspirin for 7 days. · Eat a soft diet for 24 hours. · Monitor your stools for any blood or dark black, tar-like, stools as this may be a sign of bleeding and if you see any blood, notify your doctor immediately. GET HELP RIGHT AWAY AND SEEK IMMEDIATE MEDICAL CARE IF:  · You have more than a spotting of blood in your stool. · You pass clumps of tissue (blood clots) or fill the toilet with blood. · Your belly is painfully swollen or puffy (abdominal distention). · You throw up (vomit). · You have a fever. · You have redness, pain or swelling at the IV site that last greater than two days. · You have abdominal pain or discomfort that is severe or gets worse throughout the day. Post-procedure diagnosis:  Gastritis  Diverticulosis  Colon Polyp    Post-procedure recommendations:  EGD Findings:  Esophagus:normal  Stomach: moderate erosive gastritis with erosions in the antrum, biopsied  Duodenum/jejunum:normal, biopsied    Colon Findings:   Rectum: small internal hemorrhoids  Sigmoid: moderate diverticulosis, normal mucosa - biopsied, two sessile polyps (4-8 mm), removed with cold snare  Descending Colon: moderate diverticulosis, normal mucosa - biopsied   Transverse Colon: normal mucosa - biopsied  Ascending Colon: normal mucosa - biopsied  Cecum: normal   Terminal Ileum: normal mucosa - biopsied    Recommendations:   - Await pathology.  You should receive a letter within 2 weeks. - Resume normal medications. Start omeprazole 40 mg once daily. - Can restart plavix in 48 hours. - Recommend repeat colonoscopy in 5 years. Learning About Coronavirus [phone])  Coronavirus [phone]): Overview  What is coronavirus (COVID-19)? The coronavirus disease (COVID-19) is caused by a virus. It is an illness that was first found in [place], [place], in December 2019. It has since spread worldwide. The virus can cause fever, cough, and trouble breathing. In severe cases, it can cause pneumonia and make it hard to breathe without help. It can cause death. Coronaviruses are a large group of viruses. They cause the common cold. They also cause more serious illnesses like Middle East respiratory syndrome (MERS) and severe acute respiratory syndrome (SARS). COVID-19 is caused by a novel coronavirus. That means it's a new type that has not been seen in people before. This virus spreads person-to-person through droplets from coughing and sneezing. It can also spread when you are close to someone who is infected. And it can spread when you touch something that has the virus on it, such as a doorknob or a tabletop. What can you do to protect yourself from coronavirus (COVID-19)? The best way to protect yourself from getting sick is to:  · Avoid areas where there is an outbreak. · Avoid contact with people who may be infected. · Wash your hands often with soap or alcohol-based hand sanitizers. · Avoid crowds and try to stay at least 6 feet away from other people. · Wash your hands often, especially after you cough or sneeze. Use soap and water, and scrub for at least 20 seconds. If soap and water aren't available, use an alcohol-based hand . · Avoid touching your mouth, nose, and eyes. What can you do to avoid spreading the virus to others? To help avoid spreading the virus to others:  · Cover your mouth with a tissue when you cough or sneeze.  Then throw the tissue in the trash. · Use a disinfectant to clean things that you touch often. · Stay home if you are sick or have been exposed to the virus. Don't go to school, work, or public areas. And don't use public transportation. · If you are sick:  ? Leave your home only if you need to get medical care. But call the doctor's office first so they know you're coming. And wear a face mask, if you have one.  ? If you have a face mask, wear it whenever you're around other people. It can help stop the spread of the virus when you cough or sneeze. ? Clean and disinfect your home every day. Use household  and disinfectant wipes or sprays. Take special care to clean things that you grab with your hands. These include doorknobs, remote controls, phones, and handles on your refrigerator and microwave. And don't forget countertops, tabletops, bathrooms, and computer keyboards. When to call for help  Call 911 anytime you think you may need emergency care. For example, call if:  · You have severe trouble breathing. (You can't talk at all.)  · You have constant chest pain or pressure. · You are severely dizzy or lightheaded. · You are confused or can't think clearly. · Your face and lips have a blue color. · You pass out (lose consciousness) or are very hard to wake up. Call your doctor now if you develop symptoms such as:  · Shortness of breath. · Fever. · Cough. If you need to get care, call ahead to the doctor's office for instructions before you go. Make sure you wear a face mask, if you have one, to prevent exposing other people to the virus. Where can you get the latest information? The following health organizations are tracking and studying this virus. Their websites contain the most up-to-date information. Georgina Leger also learn what to do if you think you may have been exposed to the virus. · U.S. Centers for Disease Control and Prevention (CDC): The CDC provides updated news about the disease and travel advice. The website also tells you how to prevent the spread of infection. www.cdc.gov  · World Health Organization [facility]): WHO offers information about the virus outbreaks. WHO also has travel advice. www.who.int  Current as of: April 1, 2020               Content Version: 12.4  © 6109-4756 Healthwise, Incorporated. Care instructions adapted under license by your healthcare professional. If you have questions about a medical condition or this instruction, always ask your healthcare professional. [address] any warranty or liability for your use of this information.

## 2021-04-16 NOTE — ANESTHESIA POSTPROCEDURE EVALUATION
Post-Anesthesia Evaluation and Assessment    Patient: Mireya Ovalles MRN: [id number]  SSN: [id number]    YOB: 1950  Age: 69 y.o. Sex: male      I have evaluated the patient and they are stable and ready for discharge from the PACU. Cardiovascular Function/Vital Signs  Visit Vitals  /63   Pulse 66   Temp 36.2 °C (97.1 °F)   Resp 14   Ht 5' 9.5\" (1.765 m)   Wt 94.8 kg (209 lb)   SpO2 96%   BMI 30.42 kg/m²       Patient is status post MAC anesthesia for Procedure(s):  COLONOSCOPY AND UPPER ENDOSCOPY   :-  ESOPHAGOGASTRODUODENOSCOPY (EGD)  ESOPHAGOGASTRODUODENAL (EGD) BIOPSY  COLON BIOPSY  ENDOSCOPIC POLYPECTOMY. Nausea/Vomiting: None    Postoperative hydration reviewed and adequate. Pain:  Pain Scale 1: Numeric (0 - 10) (04/16/21 1325)  Pain Intensity 1: 0 (04/16/21 1325)   Managed    Neurological Status: At baseline    Mental Status, Level of Consciousness: Alert and  oriented to person, place, and time    Pulmonary Status:   O2 Device: None (Room air) (04/16/21 1325)   Adequate oxygenation and airway patent    Complications related to anesthesia: None    Post-anesthesia assessment completed. No concerns    Signed By: Tamika Doherty MD     April 16, 2021              Procedure(s):  COLONOSCOPY AND UPPER ENDOSCOPY   :-  ESOPHAGOGASTRODUODENOSCOPY (EGD)  ESOPHAGOGASTRODUODENAL (EGD) BIOPSY  COLON BIOPSY  ENDOSCOPIC POLYPECTOMY. MAC    <[email or web address]>    INITIAL Post-op Vital signs:   Vitals Value Taken Time   /65 04/16/21 1335   Temp 36.2 °C (97.1 °F) 04/16/21 1310   Pulse 62 04/16/21 1338   Resp 17 04/16/21 1338   SpO2 98 % 04/16/21 1338   Vitals shown include unvalidated device data.

## 2021-04-16 NOTE — ANESTHESIA PREPROCEDURE EVALUATION
Relevant Problems   RESPIRATORY SYSTEM   (+) Smoker      NEUROLOGY   (+) Amaurosis fugax   (+) CVA (cerebral vascular accident) ([facility] [address]      CARDIOVASCULAR   (+) Essential hypertension       Anesthetic History   No history of anesthetic complications            Review of Systems / Medical History  Patient summary reviewed, nursing notes reviewed and pertinent labs reviewed    Pulmonary          Smoker         Neuro/Psych         TIA     Cardiovascular    Hypertension        Dysrhythmias   Hyperlipidemia    Exercise tolerance: >4 METS  Comments: H/o rheumatic fever   GI/Hepatic/Renal  Within defined limits              Endo/Other      Hypothyroidism  Arthritis     Other Findings              Physical Exam    Airway  Mallampati: II  TM Distance: 4 - 6 cm  Neck ROM: normal range of motion   Mouth opening: Normal     Cardiovascular  Regular rate and rhythm,  S1 and S2 normal,  no murmur, click, rub, or gallop             Dental    Dentition: Upper partial plate and Lower partial plate     Pulmonary  Breath sounds clear to auscultation               Abdominal  GI exam deferred       Other Findings            Anesthetic Plan    ASA: 3  Anesthesia type: MAC          Induction: Intravenous  Anesthetic plan and risks discussed with: Patient

## 2021-04-16 NOTE — PERIOP NOTES

## 2021-04-16 NOTE — PROCEDURES
118 [facility].  [address] [place], [place] [address]Carolina Sarmiento [address], [address]  [phone]                           Colonoscopy and EGD Procedure Note      Indications:  Diarrhea, personal history colon polyps     :  Suresh Mart MD    Staff: Endoscopy Technician-1: Shanna Piper  Endoscopy RN-1: Rudi Sprague RN    Referring Provider: Carrie Greene DO    Sedation:  MAC anesthesia    Procedure Details:  After informed consent was obtained with all risks and benefits of procedure explained and pre-operative exam completed, pt was placed in the left lateral decubitus position. Following sequential administration of sedation as per above, the gastroscope was inserted into the mouth and advanced under direct vision to second portion of the duodenum. A careful inspection was made as the gastroscope was withdrawn, including a retroflexed view of the proximal stomach; findings and interventions are described below. EGD Findings:  Esophagus:normal  Stomach: moderate erosive gastritis with erosions in the antrum, biopsied  Duodenum/jejunum:normal, biopsied    EGD Interventions:  biopsies    The bed was then turned and upon sequential sedation as per above, a digital rectal exam was performed per below. The Olympus videocolonoscope was inserted in the rectum and carefully advanced to the terminal ileum. The quality of preparation was good. Madison Bowel Prep Score  3/3/3. The colonoscope was slowly withdrawn with careful evaluation between folds. Retroflexion in the rectum was performed.      Colon Findings:   Rectum: small internal hemorrhoids  Sigmoid: moderate diverticulosis, normal mucosa - biopsied, two sessile polyps (4-8 mm), removed with cold snare  Descending Colon: moderate diverticulosis, normal mucosa - biopsied   Transverse Colon: normal mucosa - biopsied  Ascending Colon: normal mucosa - biopsied  Cecum: normal   Terminal Ileum: normal mucosa - biopsied    Colonoscopy Interventions:  Biopsies and snare polypectomy            Specimens Removed:    ID Type Source Tests Collected by Time Destination   1 : Duodenal bx Preservative Duodenum  Gerardo Stroud MD 4/16/2021 1239 Pathology   2 : Gastric bx Preservative Gastric  Gerardo Stroud MD 4/16/2021 1239 Pathology   3 : Ileum bx Preservative Ileum  Gerardo Stroud MD 4/16/2021 1250 Pathology   4 : Random Colon bx Preservative   Gerardo Stroud MD 4/16/2021 1252 Pathology   5 : Sigmoid polyps Preservative Sigmoid  Gerardo Stroud MD 4/16/2021 1259 Pathology       Complications: None. EBL:  Minimal     Impression:    See Postoperative diagnosis above    Recommendations:   - Await pathology. You should receive a letter within 2 weeks. - Resume normal medications. Start omeprazole 40 mg once daily. - Can restart plavix in 48 hours. - Recommend repeat colonoscopy in 5 years. Discharge Disposition:  Home in the company of a  when able to ambulate.     Maria Luz Yeung MD  4/16/2021  1:07 PM

## 2021-04-16 NOTE — ROUTINE PROCESS
Dominguez Bennett  1950  [id number]    Situation:  Verbal report received from: Walter Childs  Procedure: Procedure(s):  COLONOSCOPY AND UPPER ENDOSCOPY   :-  ESOPHAGOGASTRODUODENOSCOPY (EGD)  ESOPHAGOGASTRODUODENAL (EGD) BIOPSY  COLON BIOPSY  ENDOSCOPIC POLYPECTOMY    Background:    Preoperative diagnosis: ABNORMAL WEIGHT LOSS, DIARRHEA, PERSONAL HX OF COLONIC POLYPS  Postoperative diagnosis: Gastritis  Diverticulosis  Colon Polyp    :  Dr. Rayford Nageotte  Assistant(s): Endoscopy Technician-1: Juana Armenta  Endoscopy RN-1: Emanuel Rivera RN    Specimens:   ID Type Source Tests Collected by Time Destination   1 : Duodenal bx Preservative Duodenum  Karina Rodarte MD 4/16/2021 1239 Pathology   2 : Gastric bx Preservative Gastric  Karina Rodarte MD 4/16/2021 1239 Pathology   3 : Ileum bx Preservative Ileum  Karina Rodarte MD 4/16/2021 1250 Pathology   4 : Random Colon bx Preservative   Karina Rodarte MD 4/16/2021 1252 Pathology   5 : Sigmoid polyps Preservative Sigmoid  Karina Rodarte MD 4/16/2021 1259 Pathology     H. Pylori  no    Assessment:  Intra-procedure medications     Anesthesia gave intra-procedure sedation and medications, see anesthesia flow sheet no    Intravenous fluids: NS@ KVO     Vital signs stable     Abdominal assessment: round and soft     Recommendation:  Discharge patient per MD order-yes  Family-yes  Permission to share finding with family -yes

## 2021-04-30 RX ORDER — LOSARTAN POTASSIUM AND HYDROCHLOROTHIAZIDE 25; 100 MG/1; MG/1
TABLET ORAL
Qty: 90 TAB | Refills: 1 | Status: SHIPPED | OUTPATIENT
Start: 2021-04-30 | End: 2021-10-25

## 2021-06-02 ENCOUNTER — TRANSCRIBE ORDER (OUTPATIENT)
Dept: SCHEDULING | Age: 71
End: 2021-06-02

## 2021-06-02 DIAGNOSIS — R10.84 ABDOMINAL PAIN, GENERALIZED: ICD-10-CM

## 2021-06-02 DIAGNOSIS — R63.4 LOSS OF WEIGHT: ICD-10-CM

## 2021-06-02 DIAGNOSIS — R19.7 DIARRHEA: Primary | ICD-10-CM

## 2021-06-02 DIAGNOSIS — Z80.0 FAMILY HISTORY OF MALIGNANT NEOPLASM OF GASTROINTESTINAL TRACT: ICD-10-CM

## 2021-06-03 ENCOUNTER — HOSPITAL ENCOUNTER (OUTPATIENT)
Dept: CT IMAGING | Age: 71
Discharge: HOME | End: 2021-06-03
Attending: INTERNAL MEDICINE
Payer: MEDICARE

## 2021-06-03 DIAGNOSIS — R63.4 LOSS OF WEIGHT: ICD-10-CM

## 2021-06-03 DIAGNOSIS — Z80.0 FAMILY HISTORY OF MALIGNANT NEOPLASM OF GASTROINTESTINAL TRACT: ICD-10-CM

## 2021-06-03 DIAGNOSIS — R10.84 ABDOMINAL PAIN, GENERALIZED: ICD-10-CM

## 2021-06-03 DIAGNOSIS — R19.7 DIARRHEA: ICD-10-CM

## 2021-06-03 PROCEDURE — 74177 CT ABD & PELVIS W/CONTRAST: CPT

## 2021-06-03 PROCEDURE — 74011000636 HC RX REV CODE- 636: Performed by: RADIOLOGY

## 2021-06-03 RX ADMIN — IOPAMIDOL 100 ML: 755 INJECTION, SOLUTION INTRAVENOUS at 17:21

## 2021-06-18 ENCOUNTER — OFFICE VISIT (OUTPATIENT)
Dept: INTERNAL MEDICINE CLINIC | Age: 71
End: 2021-06-18
Payer: MEDICARE

## 2021-06-18 VITALS
HEIGHT: 69 IN | HEART RATE: 79 BPM | TEMPERATURE: 97.8 F | SYSTOLIC BLOOD PRESSURE: 132 MMHG | DIASTOLIC BLOOD PRESSURE: 78 MMHG | WEIGHT: 182.8 LBS | BODY MASS INDEX: 27.08 KG/M2 | OXYGEN SATURATION: 99 % | RESPIRATION RATE: 16 BRPM

## 2021-06-18 DIAGNOSIS — I10 ESSENTIAL HYPERTENSION: ICD-10-CM

## 2021-06-18 DIAGNOSIS — E27.8 ADRENAL NODULE (HCC): Primary | ICD-10-CM

## 2021-06-18 DIAGNOSIS — E04.1 LEFT THYROID NODULE: ICD-10-CM

## 2021-06-18 DIAGNOSIS — B35.6 JOCK ITCH: ICD-10-CM

## 2021-06-18 DIAGNOSIS — Z23 NEED FOR TDAP VACCINATION: ICD-10-CM

## 2021-06-18 DIAGNOSIS — I65.23 STENOSIS OF BOTH INTERNAL CAROTID ARTERIES: ICD-10-CM

## 2021-06-18 DIAGNOSIS — K52.832 LYMPHOCYTIC COLITIS: ICD-10-CM

## 2021-06-18 DIAGNOSIS — Z86.69 HISTORY OF AMAUROSIS FUGAX: ICD-10-CM

## 2021-06-18 PROCEDURE — G8754 DIAS BP LESS 90: HCPCS | Performed by: INTERNAL MEDICINE

## 2021-06-18 PROCEDURE — G8417 CALC BMI ABV UP PARAM F/U: HCPCS | Performed by: INTERNAL MEDICINE

## 2021-06-18 PROCEDURE — G8427 DOCREV CUR MEDS BY ELIG CLIN: HCPCS | Performed by: INTERNAL MEDICINE

## 2021-06-18 PROCEDURE — 90715 TDAP VACCINE 7 YRS/> IM: CPT | Performed by: INTERNAL MEDICINE

## 2021-06-18 PROCEDURE — 99214 OFFICE O/P EST MOD 30 MIN: CPT | Performed by: INTERNAL MEDICINE

## 2021-06-18 PROCEDURE — G8536 NO DOC ELDER MAL SCRN: HCPCS | Performed by: INTERNAL MEDICINE

## 2021-06-18 PROCEDURE — G8510 SCR DEP NEG, NO PLAN REQD: HCPCS | Performed by: INTERNAL MEDICINE

## 2021-06-18 PROCEDURE — 1101F PT FALLS ASSESS-DOCD LE1/YR: CPT | Performed by: INTERNAL MEDICINE

## 2021-06-18 PROCEDURE — G8752 SYS BP LESS 140: HCPCS | Performed by: INTERNAL MEDICINE

## 2021-06-18 PROCEDURE — 3017F COLORECTAL CA SCREEN DOC REV: CPT | Performed by: INTERNAL MEDICINE

## 2021-06-18 PROCEDURE — G0463 HOSPITAL OUTPT CLINIC VISIT: HCPCS | Performed by: INTERNAL MEDICINE

## 2021-06-18 RX ORDER — BUDESONIDE 3 MG/1
CAPSULE, COATED PELLETS ORAL
COMMUNITY
Start: 2021-05-17

## 2021-06-18 NOTE — PROGRESS NOTES
Health Maintenance Due   Topic Date Due    Shingrix Vaccine Age 49> (1 of 2) Never done    DTaP/Tdap/Td series (2 - Td or Tdap) 04/06/2020    Medicare Yearly Exam  06/10/2021       Chief Complaint   Patient presents with    Annual Wellness Visit    Cyst     wants to discuss nodule on tyroid and renal gland     Skin Problem     ointment not working still itches       1. Have you been to the ER, urgent care clinic since your last visit? Hospitalized since your last visit? No    2. Have you seen or consulted any other health care providers outside of the [address] [place] [address] since your last visit? Include any pap smears or colon screening. No    3) Do you have an Advance Directive on file? no    4) Are you interested in receiving information on Advance Directives? NO      Patient is accompanied by self I have received verbal consent from Chadwick Acosta to discuss any/all medical information while they are present in the room.

## 2021-06-22 LAB
ALBUMIN SERPL-MCNC: 4.4 G/DL (ref 3.7–4.7)
ALBUMIN/GLOB SERPL: 2 {RATIO} (ref 1.2–2.2)
ALP SERPL-CCNC: 100 IU/L (ref 48–121)
ALT SERPL-CCNC: 29 IU/L (ref 0–44)
AST SERPL-CCNC: 25 IU/L (ref 0–40)
BILIRUB SERPL-MCNC: 0.7 MG/DL (ref 0–1.2)
BUN SERPL-MCNC: 14 MG/DL (ref 8–27)
BUN/CREAT SERPL: 13 (ref 10–24)
CALCIUM SERPL-MCNC: 9.8 MG/DL (ref 8.6–10.2)
CHLORIDE SERPL-SCNC: 97 MMOL/L (ref 96–106)
CO2 SERPL-SCNC: 27 MMOL/L (ref 20–29)
CORTIS AM PEAK SERPL-MCNC: 8.5 UG/DL (ref 6.2–19.4)
CREAT SERPL-MCNC: 1.09 MG/DL (ref 0.76–1.27)
GLOBULIN SER CALC-MCNC: 2.2 G/DL (ref 1.5–4.5)
GLUCOSE SERPL-MCNC: 95 MG/DL (ref 65–99)
POTASSIUM SERPL-SCNC: 4.6 MMOL/L (ref 3.5–5.2)
PROT SERPL-MCNC: 6.6 G/DL (ref 6–8.5)
SODIUM SERPL-SCNC: 139 MMOL/L (ref 134–144)
TSH SERPL DL<=0.005 MIU/L-ACNC: 1.24 UIU/ML (ref 0.45–4.5)

## 2021-06-29 ENCOUNTER — HOSPITAL ENCOUNTER (OUTPATIENT)
Dept: CT IMAGING | Age: 71
Discharge: HOME | End: 2021-06-29
Attending: INTERNAL MEDICINE
Payer: MEDICARE

## 2021-06-29 DIAGNOSIS — K52.832 LYMPHOCYTIC COLITIS: ICD-10-CM

## 2021-06-29 DIAGNOSIS — E27.8 ADRENAL NODULE (HCC): ICD-10-CM

## 2021-06-29 PROCEDURE — 74170 CT ABD WO CNTRST FLWD CNTRST: CPT

## 2021-06-29 PROCEDURE — 74011000636 HC RX REV CODE- 636: Performed by: RADIOLOGY

## 2021-06-29 RX ADMIN — IOPAMIDOL 100 ML: 612 INJECTION, SOLUTION INTRAVENOUS at 13:30

## 2021-06-30 NOTE — PROGRESS NOTES
HISTORY OF PRESENT ILLNESS  Kathrine Cristina is a 69 y.o. male. Pt. comes in for f/u. Has a few chronic medical issues as documented. Vital signs are stable. Had recent EGD and colonoscopy. I reviewed findings with patient. There was evidence of mild gastritis, lymphocytic colitis, diverticulosis, and polyps. He is taking Entocort prescribed by GI MD.  CT of abdomen/pelvis showed adrenal nodules felt to be adenomas. Denies any related signs or symptoms. History of amaurosis fugax. Had a recent carotid Doppler showing less than 50% stenosis with incidental findings of left thyroid nodule. He denies any related signs or symptoms. Continues to have jock itch. Tells me clobetasol has not helped much. PMH/PSH/Allergies/Social History/medication list and most recent studies reviewed with patient. Tobacco use: No  Alcohol use: Social    Reports compliance with medications and diet. Trying to be active physically to control weight. Reports no other new c/o. HPI    Review of Systems   Constitutional: Negative. HENT: Negative. Eyes: Negative. Negative for blurred vision. Respiratory: Negative. Negative for shortness of breath. Cardiovascular: Negative. Negative for chest pain and leg swelling. Gastrointestinal: Negative for abdominal pain and heartburn. Genitourinary: Negative. Negative for dysuria. Musculoskeletal: Positive for joint pain. Negative for falls. Skin: Positive for itching. Negative for rash. Neurological: Negative. Negative for dizziness, sensory change, focal weakness and headaches. Endo/Heme/Allergies: Negative. Negative for polydipsia. Psychiatric/Behavioral: Negative. Negative for depression. The patient is not nervous/anxious and does not have insomnia. Physical Exam  Vitals and nursing note reviewed. Constitutional:       General: He is not in acute distress. Appearance: He is well-developed.       Comments: Pleasant man   HENT:      Head: Normocephalic and atraumatic. Mouth/Throat:      Mouth: Mucous membranes are moist.      Pharynx: Oropharynx is clear. Eyes:      General: No scleral icterus. Conjunctiva/sclera: Conjunctivae normal.   Neck:      Thyroid: No thyromegaly. Vascular: No carotid bruit or JVD. Comments: No thyromegaly  Cardiovascular:      Rate and Rhythm: Normal rate and regular rhythm. Heart sounds: Normal heart sounds. No murmur heard. Pulmonary:      Effort: Pulmonary effort is normal. No respiratory distress. Breath sounds: Normal breath sounds. No wheezing or rales. Abdominal:      General: Bowel sounds are normal. There is no distension. Palpations: Abdomen is soft. Tenderness: There is no abdominal tenderness. There is no guarding. Musculoskeletal:         General: Tenderness (Bilateral shoulders and hips, chronic) present. Cervical back: Normal range of motion and neck supple. No tenderness. Right lower leg: No edema. Left lower leg: No edema. Comments: DJD   Lymphadenopathy:      Cervical: No cervical adenopathy. Skin:     General: Skin is warm and dry. Findings: No erythema or rash. Neurological:      Mental Status: He is alert and oriented to person, place, and time. Cranial Nerves: No cranial nerve deficit. Coordination: Coordination normal.   Psychiatric:         Behavior: Behavior normal.         ASSESSMENT and PLAN  Diagnoses and all orders for this visit:    1. Adrenal nodule (HCC)  -     METABOLIC PANEL, COMPREHENSIVE; Future  -     TSH 3RD GENERATION; Future  -     CORTISOL, AM; Future    2. Left thyroid nodule  -     TSH 3RD GENERATION; Future    3. Lymphocytic colitis    4. Essential hypertension    5. Stenosis of both internal carotid arteries    6. History of amaurosis fugax    7.  Jock itch  -     REFERRAL TO DERMATOLOGY    8. Need for Tdap vaccination  -     TETANUS, DIPHTHERIA TOXOIDS AND ACELLULAR PERTUSSIS VACCINE (TDAP), IN INDIVIDS. >=7, IM    Other orders  -     METABOLIC PANEL, COMPREHENSIVE  -     TSH 3RD GENERATION  -     CORTISOL, AM      Follow-up and Dispositions    · Return in about 6 months (around 12/18/2021).      lab results and schedule of future lab studies reviewed with patient  reviewed diet, exercise and weight control  reviewed medications and side effects in detail  F/u with other MD's as scheduled  COVID-19 precautions discussed with pt  Reassured patient that incidental findings are most likely benign but we will do further testing to make sure

## 2021-07-01 ENCOUNTER — TELEPHONE (OUTPATIENT)
Dept: INTERNAL MEDICINE CLINIC | Age: 71
End: 2021-07-01

## 2021-07-01 NOTE — TELEPHONE ENCOUNTER
----- Message from Adilson Gann sent at 7/1/2021 12:48 PM EDT -----  Regarding: Dr. Jamarcus Liao telephone  General Message/Vendor Calls    Caller's first and last name: Pt       Reason for call: Pt inquiring  second request for CT scans via mail       Callback required yes/no and why: yes       Best contact number(s): [phone]      Details to clarify the request: n/a       Adilson Gann

## 2021-07-01 NOTE — TELEPHONE ENCOUNTER
Pt states he had called just wanting to know why he received CT results twice in the mail. Pt states he has questions but he rather just ask at his next visit.   There was verbalizing understanding

## 2021-07-19 ENCOUNTER — TELEPHONE (OUTPATIENT)
Dept: INTERNAL MEDICINE CLINIC | Age: 71
End: 2021-07-19

## 2021-07-19 NOTE — TELEPHONE ENCOUNTER
----- Message from Cinthia Canseco DO sent at 7/16/2021  1:00 PM EDT -----  He has bilateral adrenal adenoma  No further testing at this point needed

## 2021-08-10 ENCOUNTER — OFFICE VISIT (OUTPATIENT)
Dept: INTERNAL MEDICINE CLINIC | Age: 71
End: 2021-08-10
Payer: MEDICARE

## 2021-08-10 VITALS
HEART RATE: 85 BPM | DIASTOLIC BLOOD PRESSURE: 68 MMHG | TEMPERATURE: 98.1 F | RESPIRATION RATE: 16 BRPM | BODY MASS INDEX: 27.49 KG/M2 | SYSTOLIC BLOOD PRESSURE: 124 MMHG | OXYGEN SATURATION: 98 % | WEIGHT: 185.6 LBS | HEIGHT: 69 IN

## 2021-08-10 DIAGNOSIS — I10 ESSENTIAL HYPERTENSION: Primary | ICD-10-CM

## 2021-08-10 DIAGNOSIS — B35.6 JOCK ITCH: ICD-10-CM

## 2021-08-10 DIAGNOSIS — I65.23 STENOSIS OF BOTH INTERNAL CAROTID ARTERIES: ICD-10-CM

## 2021-08-10 DIAGNOSIS — E27.8 ADRENAL NODULE (HCC): ICD-10-CM

## 2021-08-10 DIAGNOSIS — K52.832 LYMPHOCYTIC COLITIS: ICD-10-CM

## 2021-08-10 DIAGNOSIS — Z00.00 MEDICARE ANNUAL WELLNESS VISIT, SUBSEQUENT: ICD-10-CM

## 2021-08-10 DIAGNOSIS — Z86.69 HISTORY OF AMAUROSIS FUGAX: ICD-10-CM

## 2021-08-10 PROCEDURE — 1101F PT FALLS ASSESS-DOCD LE1/YR: CPT | Performed by: INTERNAL MEDICINE

## 2021-08-10 PROCEDURE — G8427 DOCREV CUR MEDS BY ELIG CLIN: HCPCS | Performed by: INTERNAL MEDICINE

## 2021-08-10 PROCEDURE — G8752 SYS BP LESS 140: HCPCS | Performed by: INTERNAL MEDICINE

## 2021-08-10 PROCEDURE — G8754 DIAS BP LESS 90: HCPCS | Performed by: INTERNAL MEDICINE

## 2021-08-10 PROCEDURE — 99214 OFFICE O/P EST MOD 30 MIN: CPT | Performed by: INTERNAL MEDICINE

## 2021-08-10 PROCEDURE — 3017F COLORECTAL CA SCREEN DOC REV: CPT | Performed by: INTERNAL MEDICINE

## 2021-08-10 PROCEDURE — G8536 NO DOC ELDER MAL SCRN: HCPCS | Performed by: INTERNAL MEDICINE

## 2021-08-10 PROCEDURE — G0439 PPPS, SUBSEQ VISIT: HCPCS | Performed by: INTERNAL MEDICINE

## 2021-08-10 PROCEDURE — G8510 SCR DEP NEG, NO PLAN REQD: HCPCS | Performed by: INTERNAL MEDICINE

## 2021-08-10 PROCEDURE — G8417 CALC BMI ABV UP PARAM F/U: HCPCS | Performed by: INTERNAL MEDICINE

## 2021-08-10 PROCEDURE — G0463 HOSPITAL OUTPT CLINIC VISIT: HCPCS | Performed by: INTERNAL MEDICINE

## 2021-08-10 NOTE — PROGRESS NOTES
Health Maintenance Due   Topic Date Due    Shingrix Vaccine Age 49> (1 of 2) Never done    Medicare Yearly Exam  06/10/2021       Chief Complaint   Patient presents with    Cerebrovascular Accident    Hypertension    Other     6m f/u       1. Have you been to the ER, urgent care clinic since your last visit? Hospitalized since your last visit? No    2. Have you seen or consulted any other health care providers outside of the [address] [place] [address] since your last visit? Include any pap smears or colon screening. No    3) Do you have an Advance Directive on file? no    4) Are you interested in receiving information on Advance Directives? NO      Patient is accompanied by self I have received verbal consent from Maite Sanford to discuss any/all medical information while they are present in the room.

## 2021-08-10 NOTE — PROGRESS NOTES
HISTORY OF PRESENT ILLNESS  Floydene Kayser is a 69 y.o. male. Pt. comes in for f/u. Has a few chronic medical issues as documented including HTN, lymphocytic colitis, carotid stenosis, bilateral adrenal adenomas and jock itch. .  Vital signs are stable. Followed by GI. Taking Entocort. Reports GI issues being stable. repeat CT of abdomen pelvis confirmed renal adenomas. He has history of amaurosis fugax. Carotid Doppler showed less than 50% stenosis. He denies any related signs or symptoms. Continues to have jock itch. Clobetasol did not help. Has appointment with dermatologist.  Has had Covid vaccination. Denies any related issues. PMH/PSH/Allergies/Social History/medication list and most recent studies reviewed with patient. Tobacco use: No  Alcohol use: Social  Reports compliance with medications and diet. Trying to be active physically to control weight. Reports no other new c/o. HPI    Review of Systems   Constitutional: Negative. HENT: Negative. Eyes: Negative. Negative for blurred vision. Respiratory: Negative. Negative for shortness of breath. Cardiovascular: Negative. Negative for chest pain and leg swelling. Gastrointestinal: Negative for abdominal pain and heartburn. Genitourinary: Negative. Negative for dysuria. Musculoskeletal: Positive for joint pain. Negative for falls. Skin: Positive for itching. Negative for rash. Neurological: Negative. Negative for dizziness, sensory change, focal weakness and headaches. Endo/Heme/Allergies: Negative. Negative for polydipsia. Psychiatric/Behavioral: Negative. Negative for depression. The patient is not nervous/anxious and does not have insomnia. Physical Exam  Vitals and nursing note reviewed. Constitutional:       General: He is not in acute distress. Appearance: He is well-developed. Comments: Pleasant man   HENT:      Head: Normocephalic and atraumatic.       Mouth/Throat:      Mouth: Mucous membranes are moist.   Eyes:      General: No scleral icterus. Conjunctiva/sclera: Conjunctivae normal.   Neck:      Thyroid: No thyromegaly. Vascular: No carotid bruit or JVD. Comments: No thyromegaly  Cardiovascular:      Rate and Rhythm: Normal rate and regular rhythm. Heart sounds: Normal heart sounds. No murmur heard. Pulmonary:      Effort: Pulmonary effort is normal. No respiratory distress. Breath sounds: Normal breath sounds. No wheezing or rales. Abdominal:      General: Bowel sounds are normal. There is no distension. Palpations: Abdomen is soft. Tenderness: There is no abdominal tenderness. There is no guarding. Musculoskeletal:         General: Tenderness (Bilateral shoulders and hips, chronic) present. Cervical back: Normal range of motion and neck supple. Right lower leg: No edema. Left lower leg: No edema. Comments: DJD   Lymphadenopathy:      Cervical: No cervical adenopathy. Skin:     General: Skin is warm and dry. Findings: No erythema or rash. Neurological:      Mental Status: He is alert and oriented to person, place, and time. Cranial Nerves: No cranial nerve deficit. Coordination: Coordination normal.   Psychiatric:         Behavior: Behavior normal.         ASSESSMENT and PLAN  Diagnoses and all orders for this visit:    1. Essential hypertension    2. Lymphocytic colitis    3. Stenosis of both internal carotid arteries    4. History of amaurosis fugax    5. Jock itch    6. Adrenal nodule ([address]    7. Medicare annual wellness visit, subsequent      Follow-up and Dispositions    · Return in about 6 months (around 2/10/2022).      lab results and schedule of future lab studies reviewed with patient  reviewed diet, exercise and weight control  reviewed medications and side effects in detail  F/u with other MD's as scheduled  Monitor BP at home with goal of 140/90 or less  COVID-19 precautions discussed with pt  Overall stable

## 2021-08-17 ENCOUNTER — TELEPHONE (OUTPATIENT)
Dept: NEUROSURGERY | Age: 71
End: 2021-08-17

## 2021-08-17 DIAGNOSIS — I65.29 INTERNAL CAROTID ARTERY STENOSIS, UNSPECIFIED LATERALITY: Primary | ICD-10-CM

## 2021-08-17 NOTE — TELEPHONE ENCOUNTER
Return call to patient's message regarding he is having some vascular bleeding in his eyes, on his arms and legs. Unable to reach patient on mobile or home phone. Call placed to daughter, Efrain Funes. Daughter stated patient was at a Vascular surgeons office for bleediing behind his eye. Patient is seeing a Retinol doctor. Daughter stated she would text patient to call the office.

## 2021-08-17 NOTE — TELEPHONE ENCOUNTER
Return call from patient. Patient stated he was having bleeding behind his eye and bruising on both upper and lower extremities. Patient cut call short as he was going into exam room. .  Asked that patient return the call afterwards. Patient stated understanding.

## 2021-08-18 ENCOUNTER — HOSPITAL ENCOUNTER (OUTPATIENT)
Dept: LAB | Age: 71
Discharge: HOME | End: 2021-08-18

## 2021-08-18 DIAGNOSIS — I65.29 INTERNAL CAROTID ARTERY STENOSIS, UNSPECIFIED LATERALITY: ICD-10-CM

## 2021-08-18 LAB
ASPIRIN TEST, ASPIRN: 546 ARU
P2Y12 PLT RESPONSE,PPPR: 164 PRU (ref 194–418)

## 2021-08-19 ENCOUNTER — TELEPHONE (OUTPATIENT)
Dept: NEUROSURGERY | Age: 71
End: 2021-08-19

## 2021-08-19 ENCOUNTER — DOCUMENTATION ONLY (OUTPATIENT)
Dept: NEUROSURGERY | Age: 71
End: 2021-08-19

## 2021-08-19 NOTE — TELEPHONE ENCOUNTER
Spoke to patient and informed him per On Call provider change brand of aspirin he is currently taking. Patient stated he was taking generic brand of aspirin. Recommended he change to Dashawn or Ecotrin Aspirin. Patient stated understanding.

## 2021-08-19 NOTE — PROGRESS NOTES
Patient came to [facility] office after blood draw to have bruising checked. Several areas of red bruising noted to both arms. Right elbow with bruising around the entire elbow, 2 bruises noted right outer forearm about the size of a nickel and 2 bruises dime sized on inner forearm. Left inner forearm with 2 dime sized red bruises. Circled bruise on right elbow and advised patient to call  Office if bruising extends out. Patient stated understanding.

## 2021-08-24 NOTE — PROGRESS NOTES
Schedule of Personalized Health Plan  (Provide Copy to Patient)  The best way to stay healthy is to live a healthy lifestyle. A healthy lifestyle includes regular exercise, eating a well-balanced diet, keeping a healthy weight and not smoking. Regular physical exams and screening tests are another important way to take care of yourself. Preventive exams provided by health care providers can find health problems early when treatment works best and can keep you from getting certain diseases or illnesses. Preventive services include exams, lab tests, screenings, shots, monitoring and information to help you take care of your own health. All people over 65 should have a pneumonia shot. Pneumonia shots are usually only needed once in a lifetime unless your doctor decides differently. All people over 65 should have a yearly flu shot. People over 65 are at medium to high risk for Hepatitis B. Three shots are needed for complete protection. In addition to your physical exam, some screening tests are recommended:    Bone mass measurement (dexa scan) is recommended every two years if you have certain risk factors, such as personal history of vertebral fracture or chronic steroid medication use    Diabetes Mellitus screening is recommended every year. Glaucoma is an eye disease caused by high pressure in the eye. An eye exam is recommended every year. Cardiovascular screening tests that check your cholesterol and other blood fat (lipid) levels are recommended every five years. Colorectal Cancer screening tests help to find pre-cancerous polyps (growths in the colon) so they can be removed before they turn into cancer. Tests ordered for screening depend on your personal and family history risk factors.     Screening for Prostate Cancer is recommended yearly with a digital rectal exam and/or a PSA test    Here is a list of your current Health Maintenance items with a due date:  Health Maintenance Topic Date Due    Shingrix Vaccine Age 49> (1 of 2) Never done    Flu Vaccine (1) 09/01/2021    Lipid Screen  02/26/2022    Medicare Yearly Exam  08/11/2022    Colorectal Cancer Screening Combo  04/16/2031    DTaP/Tdap/Td series (3 - Td or Tdap) 06/18/2031    Hepatitis C Screening  Completed    COVID-19 Vaccine  Completed    Pneumococcal 65+ years  Completed       This is the Subsequent Medicare Annual Wellness Exam, performed 12 months or more after the Initial AWV or the last Subsequent AWV    I have reviewed the patient's medical history in detail and updated the computerized patient record. Assessment/Plan   Education and counseling provided:  Are appropriate based on today's review and evaluation    1. Essential hypertension  2. Lymphocytic colitis  3. Stenosis of both internal carotid arteries  4. History of amaurosis fugax  5. Jock itch  6. Adrenal nodule ([address]  7. Medicare annual wellness visit, subsequent       Depression Risk Factor Screening     3 most recent PHQ Screens 8/10/2021   Little interest or pleasure in doing things Not at all   Feeling down, depressed, irritable, or hopeless Not at all   Total Score PHQ 2 0       Alcohol Risk Screen    Do you average more than 1 drink per night or more than 7 drinks a week: No    In the past three months have you have had more than 4 drinks containing alcohol on one occasion: No        Functional Ability and Level of Safety    Hearing: Hearing is good. Activities of Daily Living: The home contains: no safety equipment. Patient does total self care      Ambulation: with no difficulty     Fall Risk:  Fall Risk Assessment, last 12 mths 8/10/2021   Able to walk? Yes   Fall in past 12 months? 0   Do you feel unsteady?  0   Are you worried about falling 0      Abuse Screen:  Patient is not abused       Cognitive Screening    Has your family/caregiver stated any concerns about your memory: no     Cognitive Screening: A+O x 3    Health Maintenance Due     Health Maintenance Due   Topic Date Due    Shingrix Vaccine Age 49> (1 of 2) Never done       Patient Care Team   Patient Care Team:  Leigh Do DO as PCP - General (Internal Medicine)  [facility] Do [facility]    History     Patient Active Problem List   Diagnosis Code    CVA (cerebral vascular accident) ([facility] I63.9    Amaurosis fugax G45.3    Internal carotid artery stenosis I65.29    Essential hypertension I10    Chronic pain of both shoulders M25.511, G89.29, M25.512    Chronic hip pain, bilateral M25.551, M25.552, G89.29    Smoker F17.200    Ejection fraction < 50% R94.30    Droopy eyelid, bilateral H02.403    Hyperglycemia R73.9    Spinal stenosis of lumbar region with neurogenic claudication M48.062    Jock itch B35.6    Chronic diarrhea K52.9    S/P cholecystectomy Z90.49    Left thyroid nodule E04.1    Pure hypercholesterolemia E78.00    History of amaurosis fugax Z86.69    Adrenal nodule (HCC) E27.8    Lymphocytic colitis K52.832     Past Medical History:   Diagnosis Date    Arrhythmia     \"heart murmur that comes and goes\"    Arthritis     Chronic pain     stenosis of the spine    Coagulation disorder ([facility]     on plavix    Hypertension     Ill-defined condition     elevated cholesterol    Ill-defined condition     \"blood clots both carotid arteries\"    Ill-defined condition     some decreased vision right eye/drooping of left eyelid    Ill-defined condition     \"knot on right side thyroid gland\" - found with carotid test - to be followed up    Rheumatic fever     caused a heart murmur    Seizures ([address]     hx of/told while in the [facility] they were d/t anxiety     Stroke [facility])     no deficits/visual problems at the time      Past Surgical History:   Procedure Laterality Date    COLONOSCOPY N/A 4/16/2021    COLONOSCOPY AND UPPER ENDOSCOPY   :- performed by Zi Gomez MD at [facility] ENDOSCOPY    HX APPENDECTOMY      HX CHOLECYSTECTOMY      HX GI      colonoscopy - polyps removed    HX GI      stones removed after cholecystectomy    HX HEENT      eyelids lifted d/t drooping    HX TONSILLECTOMY       Current Outpatient Medications   Medication Sig Dispense Refill    budesonide (ENTOCORT EC) 3 mg capsule TAKE 3 CAPSULES BY MOUTH DAILY FOR 8 WEEKS      atorvastatin (LIPITOR) 80 mg tablet TAKE 1 TABLET BY MOUTH DAILY 90 Tablet 1    clopidogreL (PLAVIX) 75 mg tab TAKE 1 TABLET BY MOUTH DAILY 90 Tablet 1    losartan-hydroCHLOROthiazide (HYZAAR) 100-25 mg per tablet TAKE 1 TABLET BY MOUTH EVERY DAY 90 Tab 1    TURMERIC PO Take 1 Cap by mouth daily.  multivit-min/FA/lycopen/lutein (MEN 50 PLUS MULTIVITAMIN PO) Take  by mouth. Takes one po once daily.  acetaminophen (Tylenol Extra Strength) 500 mg tablet Take 1,000 mg by mouth nightly.  menthol/camphor (BIOFREEZE EX) by Apply Externally route as needed. Roll on      FLUTICASONE PROPIONATE NA 1 [place] by Both Nostrils route two (2) times daily as needed. 50 mcg      polyvinyl alcohol/povidone (ARTIFICIAL TEARS OP) Administer 1 Drop to both eyes as needed (usually uses this 3 time a day).  albuterol (PROVENTIL HFA, VENTOLIN HFA, PROAIR HFA) 90 mcg/actuation inhaler Take 2 Puffs by inhalation every six (6) hours as needed for Wheezing. 1 Inhaler 5    clobetasoL (TEMOVATE) 0.05 % topical cream Apply  to affected area two (2) times daily as needed for Skin Irritation. 30 g 0    aspirin (ASPIRIN) 325 mg tablet Take 325 mg by mouth daily.  cholecalciferol (VITAMIN D3) 25 mcg (1,000 unit) cap Take 1,000 Units by mouth two (2) times a day.        No Known Allergies    Family History   Problem Relation Age of Onset    Pancreatic Cancer Father     Hypertension Father     Cancer Mother         larynx/\"windpipe area\"     Social History     Tobacco Use    Smoking status: Current Every Day Smoker     Packs/day: 0.50     Types: Cigarettes    Smokeless tobacco: Former User   Substance Use Topics    Alcohol use: Not Currently     Comment: yrs ago         [employer], DO

## 2021-09-02 RX ORDER — ATORVASTATIN CALCIUM 80 MG/1
TABLET, FILM COATED ORAL
Qty: 90 TABLET | Refills: 1 | Status: SHIPPED | OUTPATIENT
Start: 2021-09-02 | End: 2022-02-03 | Stop reason: SDUPTHER

## 2021-09-02 RX ORDER — CLOPIDOGREL BISULFATE 75 MG/1
TABLET ORAL
Qty: 90 TABLET | Refills: 1 | Status: SHIPPED | OUTPATIENT
Start: 2021-09-02 | End: 2022-02-03 | Stop reason: SDUPTHER

## 2021-10-06 ENCOUNTER — TELEPHONE (OUTPATIENT)
Dept: INTERNAL MEDICINE CLINIC | Age: 71
End: 2021-10-06

## 2021-10-06 NOTE — TELEPHONE ENCOUNTER
Pt requesting to have an EKG done before his scheduled appointment 11/03/2021.      Please return call

## 2021-10-11 NOTE — TELEPHONE ENCOUNTER
Pt advised that Dr. Shalom Abreu stated the EKG can be done in his upcoming office visit. There was verbalizing understanding.

## 2021-10-25 RX ORDER — LOSARTAN POTASSIUM AND HYDROCHLOROTHIAZIDE 25; 100 MG/1; MG/1
TABLET ORAL
Qty: 90 TABLET | Refills: 1 | Status: SHIPPED | OUTPATIENT
Start: 2021-10-25 | End: 2022-01-24

## 2021-11-03 ENCOUNTER — TELEPHONE (OUTPATIENT)
Dept: INTERNAL MEDICINE CLINIC | Age: 71
End: 2021-11-03

## 2021-11-03 ENCOUNTER — TRANSCRIBE ORDER (OUTPATIENT)
Dept: REGISTRATION | Age: 71
End: 2021-11-03

## 2021-11-03 ENCOUNTER — OFFICE VISIT (OUTPATIENT)
Dept: INTERNAL MEDICINE CLINIC | Age: 71
End: 2021-11-03
Payer: MEDICARE

## 2021-11-03 ENCOUNTER — HOSPITAL ENCOUNTER (OUTPATIENT)
Dept: NON INVASIVE DIAGNOSTICS | Age: 71
Discharge: HOME | End: 2021-11-03
Attending: INTERNAL MEDICINE
Payer: MEDICARE

## 2021-11-03 VITALS
WEIGHT: 188 LBS | RESPIRATION RATE: 16 BRPM | OXYGEN SATURATION: 95 % | HEART RATE: 65 BPM | DIASTOLIC BLOOD PRESSURE: 80 MMHG | BODY MASS INDEX: 27.85 KG/M2 | SYSTOLIC BLOOD PRESSURE: 132 MMHG | HEIGHT: 69 IN | TEMPERATURE: 98 F

## 2021-11-03 DIAGNOSIS — Z86.69 HISTORY OF AMAUROSIS FUGAX: ICD-10-CM

## 2021-11-03 DIAGNOSIS — Z01.818 PRE-OP EXAM: Primary | ICD-10-CM

## 2021-11-03 DIAGNOSIS — H02.403 DROOPY EYELID, BILATERAL: ICD-10-CM

## 2021-11-03 DIAGNOSIS — H49.9: ICD-10-CM

## 2021-11-03 DIAGNOSIS — Z01.818 PREOP EXAMINATION: Primary | ICD-10-CM

## 2021-11-03 DIAGNOSIS — Z01.818 PREOP EXAMINATION: ICD-10-CM

## 2021-11-03 DIAGNOSIS — I10 ESSENTIAL HYPERTENSION: ICD-10-CM

## 2021-11-03 PROCEDURE — G8752 SYS BP LESS 140: HCPCS | Performed by: INTERNAL MEDICINE

## 2021-11-03 PROCEDURE — 93005 ELECTROCARDIOGRAM TRACING: CPT

## 2021-11-03 PROCEDURE — G0463 HOSPITAL OUTPT CLINIC VISIT: HCPCS | Performed by: INTERNAL MEDICINE

## 2021-11-03 PROCEDURE — G8427 DOCREV CUR MEDS BY ELIG CLIN: HCPCS | Performed by: INTERNAL MEDICINE

## 2021-11-03 PROCEDURE — G8510 SCR DEP NEG, NO PLAN REQD: HCPCS | Performed by: INTERNAL MEDICINE

## 2021-11-03 PROCEDURE — 99214 OFFICE O/P EST MOD 30 MIN: CPT | Performed by: INTERNAL MEDICINE

## 2021-11-03 PROCEDURE — G8754 DIAS BP LESS 90: HCPCS | Performed by: INTERNAL MEDICINE

## 2021-11-03 PROCEDURE — G8536 NO DOC ELDER MAL SCRN: HCPCS | Performed by: INTERNAL MEDICINE

## 2021-11-03 PROCEDURE — 1101F PT FALLS ASSESS-DOCD LE1/YR: CPT | Performed by: INTERNAL MEDICINE

## 2021-11-03 PROCEDURE — 3017F COLORECTAL CA SCREEN DOC REV: CPT | Performed by: INTERNAL MEDICINE

## 2021-11-03 PROCEDURE — G8417 CALC BMI ABV UP PARAM F/U: HCPCS | Performed by: INTERNAL MEDICINE

## 2021-11-03 RX ORDER — HYDROCORTISONE 25 MG/G
CREAM TOPICAL
COMMUNITY
Start: 2021-10-28

## 2021-11-03 RX ORDER — NYSTATIN 100000 U/G
CREAM TOPICAL
COMMUNITY
Start: 2021-10-28

## 2021-11-03 NOTE — PROGRESS NOTES
Health Maintenance Due   Topic Date Due    Shingrix Vaccine Age 49> (1 of 2) Never done    Flu Vaccine (1) 09/01/2021       Chief Complaint   Patient presents with    Pre-op Exam     Pt is having Stabsismus surgery Lt eye       1. Have you been to the ER, urgent care clinic since your last visit? Hospitalized since your last visit? No    2. Have you seen or consulted any other health care providers outside of the [address] [place] [address] since your last visit? Include any pap smears or colon screening. No    3) Do you have an Advance Directive on file? no    4) Are you interested in receiving information on Advance Directives? NO      Patient is accompanied by self I have received verbal consent from Jared Denis to discuss any/all medical information while they are present in the room.

## 2021-11-03 NOTE — LETTER
11/3/2021 2:42 PM    Mr. Adelina Ruiz  1950  Patient Active Problem List   Diagnosis Code    [facility] I63.9    Amaurosis fugax G45.3    Internal carotid artery stenosis I65.29    Essential hypertension I10    Chronic pain of both shoulders M25.511, G89.29, M25.512    Chronic hip pain, bilateral M25.551, M25.552, G89.29    Smoker F17.200    Ejection fraction < 50% R94.30    Droopy eyelid, bilateral H02.403    Hyperglycemia R73.9    Spinal stenosis of lumbar region with neurogenic claudication M48.062    Jock itch B35.6    Chronic diarrhea K52.9    S/P cholecystectomy Z90.49    Left thyroid nodule E04.1    Pure hypercholesterolemia E78.00    History of amaurosis fugax Z86.69    Adrenal nodule (HCC) E27.8    Lymphocytic colitis K52.832     Current Outpatient Medications on File Prior to Visit   Medication Sig Dispense Refill    hydrocortisone (HYTONE) 2.5 % topical cream APPLY TO GROIN TWICE DAILY FOR 14 DAYS      nystatin (MYCOSTATIN) topical cream APPLY TO GROIN TWICE DAILY FOR 14 DAYS      losartan-hydroCHLOROthiazide (HYZAAR) 100-25 mg per tablet TAKE 1 TABLET BY MOUTH EVERY DAY 90 Tablet 1    clopidogreL (PLAVIX) 75 mg tab TAKE 1 TABLET BY MOUTH DAILY 90 Tablet 1    atorvastatin (LIPITOR) 80 mg tablet TAKE 1 TABLET BY MOUTH DAILY 90 Tablet 1    budesonide (ENTOCORT EC) 3 mg capsule TAKE 3 CAPSULES BY MOUTH DAILY FOR 8 WEEKS      TURMERIC PO Take 1 Cap by mouth daily.  multivit-min/FA/lycopen/lutein (MEN 50 PLUS MULTIVITAMIN PO) Take  by mouth. Takes one po once daily.  acetaminophen (Tylenol Extra Strength) 500 mg tablet Take 1,000 mg by mouth nightly.  menthol/camphor (BIOFREEZE EX) by Apply Externally route as needed. Roll on      FLUTICASONE PROPIONATE NA 1 [place] by Both Nostrils route two (2) times daily as needed.  50 mcg      polyvinyl alcohol/povidone (ARTIFICIAL TEARS OP) Administer 1 Drop to both eyes as needed (usually uses this 3 time a day).  albuterol (PROVENTIL HFA, VENTOLIN HFA, PROAIR HFA) 90 mcg/actuation inhaler Take 2 Puffs by inhalation every six (6) hours as needed for Wheezing. 1 Inhaler 5    clobetasoL (TEMOVATE) 0.05 % topical cream Apply  to affected area two (2) times daily as needed for Skin Irritation. 30 g 0    aspirin (ASPIRIN) 325 mg tablet Take 325 mg by mouth daily.  cholecalciferol (VITAMIN D3) 25 mcg (1,000 unit) cap Take 1,000 Units by mouth two (2) times a day. No current facility-administered medications on file prior to visit.      Past Surgical History:   Procedure Laterality Date    COLONOSCOPY N/A 4/16/2021    COLONOSCOPY AND UPPER ENDOSCOPY   :- [facility]    HX APPENDECTOMY      HX CHOLECYSTECTOMY      HX GI      colonoscopy - polyps removed    HX GI      stones removed after cholecystectomy    HX HEENT      eyelids lifted d/t drooping    HX TONSILLECTOMY        No Known Allergies              Sincerely,      Craig Sims DO

## 2021-11-04 LAB
ALBUMIN SERPL-MCNC: 4.6 G/DL (ref 3.7–4.7)
ALBUMIN/GLOB SERPL: 2 {RATIO} (ref 1.2–2.2)
ALP SERPL-CCNC: 113 IU/L (ref 44–121)
ALT SERPL-CCNC: 32 IU/L (ref 0–44)
AST SERPL-CCNC: 23 IU/L (ref 0–40)
ATRIAL RATE: 76 BPM
BILIRUB SERPL-MCNC: 0.4 MG/DL (ref 0–1.2)
BUN SERPL-MCNC: 12 MG/DL (ref 8–27)
BUN/CREAT SERPL: 15 (ref 10–24)
CALCIUM SERPL-MCNC: 9.9 MG/DL (ref 8.6–10.2)
CALCULATED P AXIS, ECG09: 8 DEGREES
CALCULATED R AXIS, ECG10: 41 DEGREES
CALCULATED T AXIS, ECG11: 1 DEGREES
CHLORIDE SERPL-SCNC: 97 MMOL/L (ref 96–106)
CHOLEST SERPL-MCNC: 134 MG/DL (ref 100–199)
CO2 SERPL-SCNC: 28 MMOL/L (ref 20–29)
CREAT SERPL-MCNC: 0.78 MG/DL (ref 0.76–1.27)
DIAGNOSIS, 93000: NORMAL
ERYTHROCYTE [DISTWIDTH] IN BLOOD BY AUTOMATED COUNT: 11.6 % (ref 11.6–15.4)
GLOBULIN SER CALC-MCNC: 2.3 G/DL (ref 1.5–4.5)
GLUCOSE SERPL-MCNC: 101 MG/DL (ref 65–99)
HCT VFR BLD AUTO: 42.1 % (ref 37.5–51)
HDLC SERPL-MCNC: 55 MG/DL
HGB BLD-MCNC: 14.2 G/DL (ref 13–17.7)
IMP & REVIEW OF LAB RESULTS: NORMAL
LDLC SERPL CALC-MCNC: 50 MG/DL (ref 0–99)
MCH RBC QN AUTO: 32.3 PG (ref 26.6–33)
MCHC RBC AUTO-ENTMCNC: 33.7 G/DL (ref 31.5–35.7)
MCV RBC AUTO: 96 FL (ref 79–97)
P-R INTERVAL, ECG05: 136 MS
PLATELET # BLD AUTO: 271 X10E3/UL (ref 150–450)
POTASSIUM SERPL-SCNC: 4.5 MMOL/L (ref 3.5–5.2)
PROT SERPL-MCNC: 6.9 G/DL (ref 6–8.5)
Q-T INTERVAL, ECG07: 416 MS
QRS DURATION, ECG06: 100 MS
QTC CALCULATION (BEZET), ECG08: 468 MS
RBC # BLD AUTO: 4.39 X10E6/UL (ref 4.14–5.8)
SODIUM SERPL-SCNC: 139 MMOL/L (ref 134–144)
TRIGL SERPL-MCNC: 176 MG/DL (ref 0–149)
VENTRICULAR RATE, ECG03: 76 BPM
VLDLC SERPL CALC-MCNC: 29 MG/DL (ref 5–40)
WBC # BLD AUTO: 8.8 X10E3/UL (ref 3.4–10.8)

## 2021-11-05 ENCOUNTER — TELEPHONE (OUTPATIENT)
Dept: INTERNAL MEDICINE CLINIC | Age: 71
End: 2021-11-05

## 2021-11-08 ENCOUNTER — TELEPHONE (OUTPATIENT)
Dept: INTERNAL MEDICINE CLINIC | Age: 71
End: 2021-11-08

## 2021-11-08 NOTE — TELEPHONE ENCOUNTER
Pre-op form faxed to [facility]. [phone] with ekg and labs and pt made aware.

## 2021-11-08 NOTE — TELEPHONE ENCOUNTER
Pt called to confirm that his Pre Op forms were faxed. Pt was informed that forms were sent over to [facility] on 11/05/2021 and 11/08/2021. Pt voiced understanding and was told to give us a call back if there were any issues with receiving the fax.

## 2021-11-22 NOTE — DISCHARGE INSTRUCTIONS
Consider seeing Dr. Severtson ENT  Consider seeing an audiologist to discuss hearing aides as a possibility.    Monitor your blood pressure at home once or twice a week, any time of day.  If it is greater than 140 on top or 90 on the bottom consistently let me know because we should talk about treatment.   Dear Alyson Brought,    Thank you for choosing [address] [place] [address] for your healthcare needs. We strive to provide EXCELLENT care to you and your family. In an effort to explain clearly why you were here in the hospital, I've also written a very brief summary below. Other details including formal diagnosis, medication changes, and follow up appointment recommendations can also be found in this packet. You were admitted for changes in vision due to internal artery stenosis for which you were started on aspirin and plavix. You also received care from specialist physicians in the following specialties:  IP CONSULT TO HOSPITALIST  IP CONSULT TO NEUROINTERVENTIONAL SURGERY  IP CONSULT TO NEUROLOGY    Here are the updates to your medication list:  ** add plavix in addition to your aspirin   ** increase your aspirin to 325mg  ** increase your atorvastatin to 80mg daily     Remember that it is important for you to take your medications exactly as they are prescribed. It is helpful to keep a list of your medication with the names, dosages, and times to be taken in your wallet. Additionally,   - Please make sure to follow up with your primary care physician within 1-2 weeks of discharge for hospital follow up. You should also follow up with Neuro interventional IR for an apt in 1 year. - Please make sure to continue to monitor for: sudden weakness, numbness, facial droop  And return to the ED with any of these signs.   - Please get up slowly from a seated or laying position, avoid falls. - Avoid tobacco, alcohol and other illicit drug use. Make sure to also see your primary care doctor for follow-up. Bring these papers with you and be sure to review your medication list with your doctor. I cannot stress the importance of follow up enough. I've included the information for your follow-up appointments below:     Follow-up Information     Follow up With Specialties Details Why Contact Info    Primary care physician   In 1 week      Claudia Sidhu MD Endovascular Surgical Neuroradiology On 2/5/2021 follow up in 1 year, for repeat carotid ultrasound  [address] [facility]  [address]  [phone]              Should you have any fever over 101 degrees for 24 hours, chest pain, shortness of breath, fever, chills, nausea, vomiting, diarrhea, change in mentation, falling, weakness, bleeding, or worsening pain, please seek medical attention immediately. Finally, as your discharging physician, you may be receiving a survey regarding my care. I would greatly value and appreciate your input in the survey as we strive for excellence. If you have any questions, I can be reached at [phone].   Thank you so much again for allowing me to care for you at [facility].    Respectfully yours,  Steven Diggs MD

## 2021-11-29 NOTE — PROGRESS NOTES
HISTORY OF PRESENT ILLNESS  Adelina Ruiz is a 66 y.o. male. Pt. comes in for f/u and preop clearance for left strabismus surgery. Needs labs and EKG. Has bilateral droopy eyelids. Denies any pain or blurred vision. Has a few chronic medical issues as documented. Vital signs are stable. Chronic medical issues have been stable on current regiment of treatment. Denies any new neurological issues. Followed by dermatologist for chronic jock itch. Steroid and antifungal creams have helped some. Has had Covid-19 vaccination. Reports taking proper precautions. Denies any related signs or symptoms. PMH/PSH/Allergies/Social History/medication list and most recent studies reviewed with patient. Tobacco use: No  Alcohol use: Social    Reports compliance with medications and diet. Trying to be active physically to control weight. Reports no other new c/o. HPI    Review of Systems   Constitutional: Negative. HENT: Negative. Eyes: Negative. Negative for blurred vision. Respiratory: Negative. Negative for shortness of breath. Cardiovascular: Negative. Negative for chest pain and leg swelling. Gastrointestinal: Negative for abdominal pain and heartburn. Genitourinary: Negative. Negative for dysuria. Musculoskeletal: Positive for joint pain. Negative for falls. Skin: Positive for itching. Negative for rash. Neurological: Negative. Negative for dizziness, sensory change, focal weakness and headaches. Endo/Heme/Allergies: Negative. Negative for polydipsia. Psychiatric/Behavioral: Negative. Negative for depression. The patient is not nervous/anxious and does not have insomnia. Physical Exam  Vitals and nursing note reviewed. Constitutional:       General: He is not in acute distress. Appearance: He is well-developed. Comments: Pleasant man   HENT:      Head: Normocephalic and atraumatic.       Mouth/Throat:      Mouth: Mucous membranes are moist.   Eyes: General: No scleral icterus. Right eye: No discharge. Left eye: No discharge. Extraocular Movements: Extraocular movements intact. Conjunctiva/sclera: Conjunctivae normal.      Pupils: Pupils are equal, round, and reactive to light. Comments: droopy eyelids   Neck:      Thyroid: No thyromegaly. Vascular: No carotid bruit or JVD. Comments: No thyromegaly  Cardiovascular:      Rate and Rhythm: Normal rate and regular rhythm. Heart sounds: Normal heart sounds. No murmur heard. Pulmonary:      Effort: Pulmonary effort is normal. No respiratory distress. Breath sounds: Normal breath sounds. No wheezing or rales. Abdominal:      General: Bowel sounds are normal. There is no distension. Palpations: Abdomen is soft. Tenderness: There is no abdominal tenderness. There is no guarding. Musculoskeletal:         General: Tenderness (Bilateral shoulders and hips, chronic) present. Cervical back: Normal range of motion and neck supple. Right lower leg: No edema. Left lower leg: No edema. Comments: DJD   Lymphadenopathy:      Cervical: No cervical adenopathy. Skin:     General: Skin is warm and dry. Findings: No erythema or rash. Neurological:      Mental Status: He is alert and oriented to person, place, and time. Cranial Nerves: No cranial nerve deficit. Coordination: Coordination normal.   Psychiatric:         Behavior: Behavior normal.         ASSESSMENT and PLAN  Diagnoses and all orders for this visit:    1. Pre-op exam  -     METABOLIC PANEL, COMPREHENSIVE; Future  -     CBC W/O DIFF; Future  -     AMB POC EKG ROUTINE W/ 12 LEADS, INTER & REP  -     EKG, 12 LEAD, INITIAL; Future    2. Paralytic strabismus of left eye  -     METABOLIC PANEL, COMPREHENSIVE; Future  -     CBC W/O DIFF; Future  -     AMB POC EKG ROUTINE W/ 12 LEADS, INTER & REP  -     EKG, 12 LEAD, INITIAL; Future    3.  Essential hypertension  -     LIPID PANEL; Future    4. Droopy eyelid, bilateral    5. History of amaurosis fugax  -     LIPID PANEL; Future      Follow-up and Dispositions    · Return in about 6 months (around 5/3/2022). All chronic medical problems are stable  Continue with current medical management and plan  lab results and schedule of future lab studies reviewed with patient  reviewed diet, exercise and weight control  reviewed medications and side effects in detail  F/u with other MD's/ providers as scheduled  COVID-19 precautions discussed with pt  An After Visit Summary was printed and given to the patient.   Medically stable for eye surgery  Preop form filled and faxed to ophthalmologist

## 2022-01-24 RX ORDER — LOSARTAN POTASSIUM AND HYDROCHLOROTHIAZIDE 25; 100 MG/1; MG/1
TABLET ORAL
Qty: 90 TABLET | Refills: 1 | Status: SHIPPED | OUTPATIENT
Start: 2022-01-24

## 2022-02-01 ENCOUNTER — OFFICE VISIT (OUTPATIENT)
Dept: INTERNAL MEDICINE CLINIC | Age: 72
End: 2022-02-01
Payer: MEDICARE

## 2022-02-01 VITALS
SYSTOLIC BLOOD PRESSURE: 134 MMHG | BODY MASS INDEX: 27.55 KG/M2 | HEIGHT: 69 IN | OXYGEN SATURATION: 97 % | HEART RATE: 88 BPM | TEMPERATURE: 98 F | RESPIRATION RATE: 16 BRPM | DIASTOLIC BLOOD PRESSURE: 72 MMHG | WEIGHT: 186 LBS

## 2022-02-01 DIAGNOSIS — J44.9 CHRONIC OBSTRUCTIVE PULMONARY DISEASE, UNSPECIFIED COPD TYPE (HCC): ICD-10-CM

## 2022-02-01 DIAGNOSIS — E27.8 ADRENAL NODULE (HCC): ICD-10-CM

## 2022-02-01 DIAGNOSIS — F17.200 SMOKER: ICD-10-CM

## 2022-02-01 DIAGNOSIS — I10 ESSENTIAL HYPERTENSION: Primary | ICD-10-CM

## 2022-02-01 DIAGNOSIS — Z86.69 HISTORY OF AMAUROSIS FUGAX: ICD-10-CM

## 2022-02-01 PROCEDURE — G8754 DIAS BP LESS 90: HCPCS | Performed by: INTERNAL MEDICINE

## 2022-02-01 PROCEDURE — G8536 NO DOC ELDER MAL SCRN: HCPCS | Performed by: INTERNAL MEDICINE

## 2022-02-01 PROCEDURE — 1101F PT FALLS ASSESS-DOCD LE1/YR: CPT | Performed by: INTERNAL MEDICINE

## 2022-02-01 PROCEDURE — 99214 OFFICE O/P EST MOD 30 MIN: CPT | Performed by: INTERNAL MEDICINE

## 2022-02-01 PROCEDURE — 3017F COLORECTAL CA SCREEN DOC REV: CPT | Performed by: INTERNAL MEDICINE

## 2022-02-01 PROCEDURE — G0463 HOSPITAL OUTPT CLINIC VISIT: HCPCS | Performed by: INTERNAL MEDICINE

## 2022-02-01 PROCEDURE — G8427 DOCREV CUR MEDS BY ELIG CLIN: HCPCS | Performed by: INTERNAL MEDICINE

## 2022-02-01 PROCEDURE — G8510 SCR DEP NEG, NO PLAN REQD: HCPCS | Performed by: INTERNAL MEDICINE

## 2022-02-01 PROCEDURE — G8419 CALC BMI OUT NRM PARAM NOF/U: HCPCS | Performed by: INTERNAL MEDICINE

## 2022-02-01 PROCEDURE — G8752 SYS BP LESS 140: HCPCS | Performed by: INTERNAL MEDICINE

## 2022-02-01 NOTE — PROGRESS NOTES
Health Maintenance Due   Topic Date Due    Shingrix Vaccine Age 48> (1 of 2) 10[birth year]11/1999       Chief Complaint   Patient presents with    Hypertension    Cholesterol Problem       1. Have you been to the ER, urgent care clinic since your last visit? Hospitalized since your last visit? No    2. Have you seen or consulted any other health care providers outside of the [address] [place] [address] since your last visit? Include any pap smears or colon screening. No    3) Do you have an Advance Directive on file? no    4) Are you interested in receiving information on Advance Directives? NO      Patient is accompanied by self I have received verbal consent from Kristina Irene to discuss any/all medical information while they are present in the room.

## 2022-02-03 RX ORDER — CLOPIDOGREL BISULFATE 75 MG/1
75 TABLET ORAL DAILY
Qty: 90 TABLET | Refills: 1 | Status: SHIPPED | OUTPATIENT
Start: 2022-02-03 | End: 2022-08-01

## 2022-02-03 RX ORDER — ATORVASTATIN CALCIUM 80 MG/1
80 TABLET, FILM COATED ORAL DAILY
Qty: 90 TABLET | Refills: 1 | Status: SHIPPED | OUTPATIENT
Start: 2022-02-03 | End: 2022-06-27 | Stop reason: SDUPTHER

## 2022-02-03 RX ORDER — ALBUTEROL SULFATE 90 UG/1
2 AEROSOL, METERED RESPIRATORY (INHALATION)
Qty: 6.7 G | Refills: 2 | Status: SHIPPED | OUTPATIENT
Start: 2022-02-03 | End: 2022-04-13 | Stop reason: SDUPTHER

## 2022-02-09 ENCOUNTER — HOSPITAL ENCOUNTER (OUTPATIENT)
Dept: VASCULAR SURGERY | Age: 72
Discharge: HOME | End: 2022-02-09
Attending: RADIOLOGY
Payer: MEDICARE

## 2022-02-09 DIAGNOSIS — I65.29 INTERNAL CAROTID ARTERY STENOSIS, UNSPECIFIED LATERALITY: ICD-10-CM

## 2022-02-09 PROCEDURE — 93880 EXTRACRANIAL BILAT STUDY: CPT

## 2022-02-10 LAB
LEFT CCA DIST DIAS: 17.1 CM/S
LEFT CCA DIST SYS: 78.7 CM/S
LEFT CCA PROX DIAS: 19.3 CM/S
LEFT CCA PROX SYS: 83.1 CM/S
LEFT ECA DIAS: 10.5 CM/S
LEFT ECA SYS: 100.6 CM/S
LEFT ICA DIST DIAS: 15.3 CM/S
LEFT ICA DIST SYS: 50.9 CM/S
LEFT ICA MID DIAS: 17.9 CM/S
LEFT ICA MID SYS: 66.9 CM/S
LEFT ICA PROX DIAS: 18.5 CM/S
LEFT ICA PROX SYS: 61 CM/S
LEFT ICA/CCA SYS: 0.9
LEFT VERTEBRAL DIAS: 12.5 CM/S
LEFT VERTEBRAL SYS: 43.8 CM/S
RIGHT CCA DIST DIAS: 11.6 CM/S
RIGHT CCA DIST SYS: 69.9 CM/S
RIGHT CCA PROX DIAS: 8.6 CM/S
RIGHT CCA PROX SYS: 80.3 CM/S
RIGHT ECA DIAS: 6.2 CM/S
RIGHT ECA SYS: 89.7 CM/S
RIGHT ICA DIST DIAS: 17.2 CM/S
RIGHT ICA DIST SYS: 68.9 CM/S
RIGHT ICA MID DIAS: 20.4 CM/S
RIGHT ICA MID SYS: 81.8 CM/S
RIGHT ICA PROX DIAS: 24.1 CM/S
RIGHT ICA PROX SYS: 111.3 CM/S
RIGHT ICA/CCA SYS: 1.6
RIGHT VERTEBRAL DIAS: 11.7 CM/S
RIGHT VERTEBRAL SYS: 53.4 CM/S

## 2022-02-10 NOTE — PROGRESS NOTES
HISTORY OF PRESENT ILLNESS  Faisal Guzman is a 66 y.o. male. Pt. comes in for f/u. Has a few chronic medical issues as documented. Vital signs are stable. BMI is 27.5. Has COPD. Continues to smoke some. Reports some dyspnea and JARA. Has a chronic morning cough with some mucus. His chronic jock itch is improved with creams given by dermatologist.  Denies any new neurological issues. Has had Covid-19 vaccination. Reports taking proper precautions. Denies any related signs or symptoms. PMH/PSH/Allergies/Social History/medication list and most recent studies reviewed with patient. Tobacco use: Yes  alcohol use: Social    Reports compliance with medications and diet. Trying to be active physically to control weight. Reports no other new c/o. HPI    Review of Systems   Constitutional: Negative. HENT: Negative. Eyes: Negative. Negative for blurred vision. Respiratory: Positive for shortness of breath (JARA). Cardiovascular: Negative. Negative for chest pain and leg swelling. Gastrointestinal: Negative for abdominal pain and heartburn. Genitourinary: Negative. Negative for dysuria. Musculoskeletal: Positive for joint pain. Negative for falls. Skin: Positive for itching. Negative for rash. Neurological: Negative. Negative for dizziness, sensory change, focal weakness and headaches. Endo/Heme/Allergies: Negative. Negative for polydipsia. Psychiatric/Behavioral: Negative. Negative for depression. The patient is not nervous/anxious and does not have insomnia. Physical Exam  Vitals and nursing note reviewed. Constitutional:       General: He is not in acute distress. Appearance: He is well-developed. Comments: Pleasant man   HENT:      Head: Normocephalic and atraumatic. Mouth/Throat:      Mouth: Mucous membranes are moist.   Eyes:      General: No scleral icterus. Right eye: No discharge. Left eye: No discharge.       Extraocular Movements: Extraocular movements intact. Conjunctiva/sclera: Conjunctivae normal.      Pupils: Pupils are equal, round, and reactive to light. Comments: droopy eyelids   Neck:      Thyroid: No thyromegaly. Vascular: No carotid bruit or JVD. Comments: No thyromegaly  Cardiovascular:      Rate and Rhythm: Normal rate and regular rhythm. Heart sounds: Normal heart sounds. No murmur heard. Pulmonary:      Effort: Pulmonary effort is normal. No respiratory distress. Breath sounds: Normal breath sounds. No wheezing or rales. Abdominal:      General: Bowel sounds are normal. There is no distension. Palpations: Abdomen is soft. Tenderness: There is no abdominal tenderness. There is no guarding. Musculoskeletal:         General: Tenderness (Bilateral shoulders and hips, chronic) present. Cervical back: Normal range of motion and neck supple. Right lower leg: No edema. Left lower leg: No edema. Comments: DJD   Lymphadenopathy:      Cervical: No cervical adenopathy. Skin:     General: Skin is warm and dry. Findings: No erythema or rash. Neurological:      Mental Status: He is alert and oriented to person, place, and time. Cranial Nerves: No cranial nerve deficit. Coordination: Coordination normal.   Psychiatric:         Behavior: Behavior normal.         ASSESSMENT and PLAN  Diagnoses and all orders for this visit:    1. Essential hypertension  Stable chronic condition. Continue current treatment/medications. Monitor BP at home with goal of 140/90 or less    2. Adrenal nodule (HCC)  Stable  3. History of amaurosis fugax  Stable chronic condition. Continue current treatment/medications. No new neurological issues  4. Smoker  -     CT LOW DOSE LUNG CANCER SCREENING; Future    5. Chronic obstructive pulmonary disease, unspecified [facility]  -     CT LOW DOSE LUNG CANCER SCREENING;  Future      Follow-up and Dispositions    · Return in about 6 months (around 8/1/2022). All chronic medical problems are stable  Continue with current medical management and plan  lab results and schedule of future lab studies reviewed with patient  reviewed diet, exercise and weight control  reviewed medications and side effects in detail  F/u with other MD's/ providers as scheduled  COVID-19 precautions discussed with pt  An After Visit Summary was printed and given to the patient.

## 2022-02-15 ENCOUNTER — TELEPHONE (OUTPATIENT)
Dept: INTERNAL MEDICINE CLINIC | Age: 72
End: 2022-02-15

## 2022-02-15 ENCOUNTER — HOSPITAL ENCOUNTER (OUTPATIENT)
Dept: CT IMAGING | Age: 72
Discharge: HOME | End: 2022-02-15
Attending: INTERNAL MEDICINE
Payer: MEDICARE

## 2022-02-15 DIAGNOSIS — F17.200 SMOKER: ICD-10-CM

## 2022-02-15 DIAGNOSIS — J44.9 CHRONIC OBSTRUCTIVE PULMONARY DISEASE, UNSPECIFIED COPD TYPE (HCC): ICD-10-CM

## 2022-02-15 PROCEDURE — 71271 CT THORAX LUNG CANCER SCR C-: CPT

## 2022-02-15 NOTE — TELEPHONE ENCOUNTER
Call placed to patient, informed immunization record has been updated. Also requesting results from CT scan.

## 2022-02-15 NOTE — TELEPHONE ENCOUNTER
Pt got the shingles vaccine at [address] [place] [address]. He states that it is still showing on my chart that he is due for this vaccine.  Pt wants to confirm this vaccine was documented in his chart and that we have received a copy from [facility]

## 2022-02-28 ENCOUNTER — TELEPHONE (OUTPATIENT)
Dept: INTERNAL MEDICINE CLINIC | Age: 72
End: 2022-02-28

## 2022-02-28 DIAGNOSIS — E27.8 ADRENAL NODULE (HCC): Primary | ICD-10-CM

## 2022-02-28 DIAGNOSIS — E04.1 LEFT THYROID NODULE: ICD-10-CM

## 2022-02-28 NOTE — TELEPHONE ENCOUNTER
Pt needs referral for endocrine doctor for his thyroid issues. Please call with info.   No insurance auth required

## 2022-03-07 ENCOUNTER — VIRTUAL VISIT (OUTPATIENT)
Dept: NEUROSURGERY | Age: 72
End: 2022-03-07
Payer: MEDICARE

## 2022-03-07 ENCOUNTER — TELEPHONE (OUTPATIENT)
Dept: NEUROSURGERY | Age: 72
End: 2022-03-07

## 2022-03-07 ENCOUNTER — HOSPITAL ENCOUNTER (OUTPATIENT)
Dept: LAB | Age: 72
Discharge: HOME | End: 2022-03-07

## 2022-03-07 DIAGNOSIS — I65.29 INTERNAL CAROTID ARTERY STENOSIS, UNSPECIFIED LATERALITY: ICD-10-CM

## 2022-03-07 DIAGNOSIS — I65.29 INTERNAL CAROTID ARTERY STENOSIS, UNSPECIFIED LATERALITY: Primary | ICD-10-CM

## 2022-03-07 LAB
ASPIRIN TEST, ASPIRN: 383 ARU
COMMENT, HOLDF: NORMAL
P2Y12 PLT RESPONSE,PPPR: 132 PRU (ref 194–418)
SAMPLES BEING HELD,HOLD: NORMAL

## 2022-03-07 PROCEDURE — G8756 NO BP MEASURE DOC: HCPCS | Performed by: RADIOLOGY

## 2022-03-07 PROCEDURE — G8419 CALC BMI OUT NRM PARAM NOF/U: HCPCS | Performed by: RADIOLOGY

## 2022-03-07 PROCEDURE — 3017F COLORECTAL CA SCREEN DOC REV: CPT | Performed by: RADIOLOGY

## 2022-03-07 PROCEDURE — 99213 OFFICE O/P EST LOW 20 MIN: CPT | Performed by: RADIOLOGY

## 2022-03-07 PROCEDURE — G8536 NO DOC ELDER MAL SCRN: HCPCS | Performed by: RADIOLOGY

## 2022-03-07 PROCEDURE — G8432 DEP SCR NOT DOC, RNG: HCPCS | Performed by: RADIOLOGY

## 2022-03-07 PROCEDURE — G8427 DOCREV CUR MEDS BY ELIG CLIN: HCPCS | Performed by: RADIOLOGY

## 2022-03-07 PROCEDURE — 1101F PT FALLS ASSESS-DOCD LE1/YR: CPT | Performed by: RADIOLOGY

## 2022-03-07 NOTE — PROGRESS NOTES
Neurointerventional Surgery Clinic Note    Kristina Irene is a 66 y.o. male who was seen by telephone on 3/7/2022. Consent: Kristina Irene, who was seen by telephone, and/or his healthcare decision maker, is aware that this patient-initiated, Telehealth encounter on 3/7/2022 is a billable service, with coverage as determined by his insurance carrier. He is aware that he may receive a bill and has provided verbal consent to proceed: Yes. Assessment & Plan:   66 y.o. male with history of multiple medical problems, including hypertension, stroke, chronic pain, tobacco abuse, and spinal stenosis who presents for follow-up imaging of carotid stenosis. Patient reports bruising of his hands and arms but no new or recurrent neurological changes. He states that his eyelid issue has resolved after an ophthalmologic intervention. He was referred to an endocrinologist for evaluation of his thyroid nodule, however he is moving to [place] and he plans to have follow-up with an endocrinologist once he arrives there. Carotid duplex scan performed 2/9/2022 showed mild bilateral carotid stenosis without significant interval change. Since he has been having some bruising of his hands and arms, I will recheck an aspirin test and platelet function test to make sure he is not supratherapeutic. Otherwise, we will continue dual antiplatelet therapy. I would order a follow-up carotid duplex scan in 1 year, however the patient is moving to [facility]. He understands that he needs to seek follow-up care once he arrives there. Plan:  -Continue dual antiplatelet therapy  -Check aspirin test and P2Y12  -Patient to establish care for imaging surveillance of his carotid arteries once he arrives in [place], [place]    I spent at least 10 minutes on this visit with this established patient.     Subjective:   Kristina Irene is a 66 y.o. male   with history of multiple medical problems, including hypertension, stroke, chronic pain, tobacco abuse, and spinal stenosis who presents for follow-up imaging of carotid stenosis. Patient reports bruising of his hands and arms. He states that his eyelid issue has resolved after an ophthalmologic intervention. He was referred to an endocrinologist for evaluation of his thyroid nodule, however he is moving to [place] and he plans to have follow-up with an endocrinologist once he arrives there. Patient denies headaches, numbness and tingling, weakness, nausea and vomiting, dizziness, balance and coordination problems, and vision changes. Chief Complaint: Intracranial Stenosis (Annual follow up)      Prior to Admission medications    Medication Sig Start Date End Date Taking? Authorizing Provider   albuterol (PROVENTIL HFA, VENTOLIN HFA, PROAIR HFA) 90 mcg/actuation inhaler Take 2 Puffs by inhalation every six (6) hours as needed for Wheezing. 2/3/22  Yes Nicki Rodriguez, DO   clopidogreL (PLAVIX) 75 mg tab Take 1 Tablet by mouth daily. 2/3/22  Yes Clayton Rodriguez DO   atorvastatin (LIPITOR) 80 mg tablet Take 1 Tablet by mouth daily. 2/3/22  Yes Nicki Rodriguez, DO   losartan-hydroCHLOROthiazide (HYZAAR) 100-25 mg per tablet TAKE 1 TABLET BY MOUTH EVERY DAY 1/24/22  Yes Ulysses Kudo, NP   hydrocortisone (HYTONE) 2.5 % topical cream APPLY TO GROIN TWICE DAILY FOR 14 DAYS 10/28/21  Yes Provider, Historical   nystatin (MYCOSTATIN) topical cream APPLY TO GROIN TWICE DAILY FOR 14 DAYS 10/28/21  Yes Provider, Historical   TURMERIC PO Take 1 Cap by mouth daily. Yes Provider, Historical   multivit-min/FA/lycopen/lutein (MEN 50 PLUS MULTIVITAMIN PO) Take  by mouth. Takes one po once daily. Yes Provider, Historical   acetaminophen (Tylenol Extra Strength) 500 mg tablet Take 1,000 mg by mouth nightly. Yes Provider, Historical   menthol/camphor (Richad Lorin) by Apply Externally route as needed.  Roll on   Yes Provider, Historical   FLUTICASONE PROPIONATE NA 1 [place] by Both Nostrils route two (2) times daily as needed. 50 mcg   Yes Provider, Historical   aspirin (ASPIRIN) 325 mg tablet Take 325 mg by mouth daily. Yes Provider, Historical   cholecalciferol (VITAMIN D3) 25 mcg (1,000 unit) cap Take 1,000 Units by mouth two (2) times a day.  11/30/19  Yes Provider, Historical   budesonide (ENTOCORT EC) 3 mg capsule TAKE 3 CAPSULES BY MOUTH DAILY FOR 8 WEEKS 5/17/21   Provider, Historical     No Known Allergies    Patient Active Problem List   Diagnosis Code    CVA (cerebral vascular accident) ([facility] I63.9    Amaurosis fugax G45.3    Internal carotid artery stenosis I65.29    Essential hypertension I10    Chronic pain of both shoulders M25.511, G89.29, M25.512    Chronic hip pain, bilateral M25.551, M25.552, G89.29    Smoker F17.200    Ejection fraction < 50% R94.30    Droopy eyelid, bilateral H02.403    Hyperglycemia R73.9    Spinal stenosis of lumbar region with neurogenic claudication M48.062    Jock itch B35.6    Chronic diarrhea K52.9    S/P cholecystectomy Z90.49    Left thyroid nodule E04.1    Pure hypercholesterolemia E78.00    History of amaurosis fugax Z86.69    Adrenal nodule (HCC) E27.8    Lymphocytic colitis K52.832    Chronic obstructive pulmonary disease, unspecified COPD type ([facility] J44.9     Patient Active Problem List    Diagnosis Date Noted    Chronic obstructive pulmonary disease, unspecified COPD type ([facility] 02/01/2022    Adrenal nodule ([facility] 06/18/2021    Lymphocytic colitis 06/18/2021    Left thyroid nodule 02/26/2021    Pure hypercholesterolemia 02/26/2021    History of amaurosis fugax 02/26/2021    Chronic diarrhea 02/09/2021    S/P cholecystectomy 02/09/2021    Spinal stenosis of lumbar region with neurogenic claudication 10/06/2020    Jock itch 10/06/2020    Essential hypertension 06/09/2020    Chronic pain of both shoulders 06/09/2020    Chronic hip pain, bilateral 06/09/2020    Smoker 06/09/2020    Ejection fraction < 50% 06/09/2020    Droopy eyelid, bilateral 06/09/2020    Hyperglycemia 06/09/2020    Amaurosis fugax 01/29/2020    Internal carotid artery stenosis 01/29/2020    CVA (cerebral vascular accident) ([facility] 01/27/2020     Current Outpatient Medications   Medication Sig Dispense Refill    albuterol (PROVENTIL HFA, VENTOLIN HFA, PROAIR HFA) 90 mcg/actuation inhaler Take 2 Puffs by inhalation every six (6) hours as needed for Wheezing. 6.7 g 2    clopidogreL (PLAVIX) 75 mg tab Take 1 Tablet by mouth daily. 90 Tablet 1    atorvastatin (LIPITOR) 80 mg tablet Take 1 Tablet by mouth daily. 90 Tablet 1    losartan-hydroCHLOROthiazide (HYZAAR) 100-25 mg per tablet TAKE 1 TABLET BY MOUTH EVERY DAY 90 Tablet 1    hydrocortisone (HYTONE) 2.5 % topical cream APPLY TO GROIN TWICE DAILY FOR 14 DAYS      nystatin (MYCOSTATIN) topical cream APPLY TO GROIN TWICE DAILY FOR 14 DAYS      TURMERIC PO Take 1 Cap by mouth daily.  multivit-min/FA/lycopen/lutein (MEN 50 PLUS MULTIVITAMIN PO) Take  by mouth. Takes one po once daily.  acetaminophen (Tylenol Extra Strength) 500 mg tablet Take 1,000 mg by mouth nightly.  menthol/camphor (BIOFREEZE EX) by Apply Externally route as needed. Roll on      FLUTICASONE PROPIONATE NA 1 [place] by Both Nostrils route two (2) times daily as needed. 50 mcg      aspirin (ASPIRIN) 325 mg tablet Take 325 mg by mouth daily.  cholecalciferol (VITAMIN D3) 25 mcg (1,000 unit) cap Take 1,000 Units by mouth two (2) times a day.       budesonide (ENTOCORT EC) 3 mg capsule TAKE 3 CAPSULES BY MOUTH DAILY FOR 8 WEEKS       No Known Allergies  Past Medical History:   Diagnosis Date    Arrhythmia     \"heart murmur that comes and goes\"    Arthritis     Bruising     Upper extremities and hands    Chronic pain     stenosis of the spine    Coagulation disorder (HCC)     on plavix    Hypertension     Ill-defined condition     elevated cholesterol    Ill-defined condition     \"blood clots both carotid arteries\"    Ill-defined condition     some decreased vision right eye/drooping of left eyelid    Ill-defined condition     \"knot on right side thyroid gland\" - found with carotid test - to be followed up    Rheumatic fever     caused a heart murmur    Seizures ([address]     hx of/told while in the [employer] they were d/t anxiety     Stroke [facility])     no deficits/visual problems at the time     Past Surgical History:   Procedure Laterality Date    COLONOSCOPY N/A 4/16/2021    COLONOSCOPY AND UPPER ENDOSCOPY   :- performed [facility] ENDOSCOPY    HX APPENDECTOMY      HX CHOLECYSTECTOMY      HX GI      colonoscopy - polyps removed    HX GI      stones removed after cholecystectomy    HX HEENT      eyelids lifted d/t drooping    HX TONSILLECTOMY       Family History   Problem Relation Age of Onset    Pancreatic Cancer Father     Hypertension Father     Cancer Mother         larynx/\"windpipe area\"     Social History     Tobacco Use    Smoking status: Current Every Day Smoker     Packs/day: 0.50     Types: Cigarettes    Smokeless tobacco: Former User   Substance Use Topics    Alcohol use: Not Currently     Comment: yrs ago       ROS: Pertinent ROS included in HPI    Objective:   Vital Signs: (As obtained by patient/caregiver at home)  There were no vitals taken for this visit. Physical exam could not be performed. We discussed the expected course, resolution and complications of the diagnosis(es) in detail. Medication risks, benefits, costs, interactions, and alternatives were discussed as indicated. I advised him to contact the office if his condition worsens, changes or fails to improve as anticipated. He expressed understanding with the diagnosis(es) and plan. Danii Lira is a 66 y.o. male who was evaluated by a video visit encounter for concerns as above. Patient identification was verified prior to start of the visit.  A caregiver was present when appropriate. Due to this being a TeleHealth encounter (During [place]-46 public health emergency), evaluation of the following organ systems was limited: Vitals/Constitutional/EENT/Resp/CV/GI//MS/Neuro/Skin/Heme-Lymph-Imm. Pursuant to the emergency declaration under the [address] [place], [place] [address] waiver authority and the [employer] Act, this Virtual  Visit was conducted, with patient's (and/or legal guardian's) consent, to reduce the patient's risk of exposure to COVID-19 and provide necessary medical care. Services were provided through a telephonic discussion virtually to substitute for in-person clinic visit. Patient was located at home, and provider was located in office. This visit was completed virtually using the telephone.       Brittney Ying MD

## 2022-03-07 NOTE — PROGRESS NOTES
Phone call to patient in preparation for Virtual/phone visit with provider. Name and  verified. Patient unable to obtain vital signs at home. Annual follow up for ICA stenosis. Patient reports he is without symptoms at this time. Reports increase in bruising on arms and hands. Patient reports he has been seen by PCP for Thyroid nodule and Ophthalmology. Patient reports he will be moving to [place] in 2022 to be closer to his daughter.

## 2022-03-07 NOTE — TELEPHONE ENCOUNTER
I called the patient and discussed the results of his platelet function tests. I advised him that he is therapeutic on both aspirin and Plavix and that he should continue taking both medications as prescribed. Patient expressed understanding and is in agreement with this plan.

## 2022-03-07 NOTE — PATIENT INSTRUCTIONS
Learning About How Hospitals and Clinics Keep You Safe From COVID-19  Overview     Many hospitals and clinics now are treating people who are infected with COVID-19. So if you're in the hospital or clinic for any other reason, this may be an unsettling time. It's common to be concerned about becoming infected with the virus. But hospitals and clinics have policies to prevent the spread of infections. For example, doctors and nurses are trained to wash their hands before they treat you. Health care centers have stepped up these policies now. They are taking further steps to protect their patients. As long as [place]JGFH-94 remains a public health problem, things are going to be different when you go to a health care facility. They may have new rules for your safety. These could include having you wear a cloth face cover, meeting you outside the clinic, and having you sit away from others in the waiting room. What are hospitals and clinics doing to keep you safe? Your care team is very aware of the threat of COVID-19. They are doing everything they can to keep you safe. Hospitals and clinics may have different policies. But in general, you may expect many of these measures:  · You will be screened for COVID-19. When you come to the hospital, you may have your temperature taken. You'll be asked about any symptoms, such as a fever, a cough, or shortness of breath. You may be asked if you've had contact with anyone who's been diagnosed with the virus. And you may be asked if you've traveled to any place that has had an outbreak. · The staff may try to do as much as possible outside the facility. For example, you may be asked to fill out paperwork online or in your car before you come inside. The person giving you a ride home may be asked to wait outside. These new rules to help protect you may make routine tasks take longer than usual.  · People who have COVID-19 are treated in a separate area.  Many hospitals and clinics have staff members who treat only these patients. This helps limit the spread of the infection. · Visitors may be limited. In some cases, no visitors are allowed. In others, only one healthy visitor is allowed. Children may be limited to having only one adult with them. You can connect with family and friends using your phone or computer. If you need something brought from home, such as glasses or a phone , find out where the item can be dropped off. · The hospital or clinic follows guidelines to prevent infection. These include:  ? Washing hands often. ? Disinfecting high-touch surfaces. ? Wearing face masks or other protective equipment. ? Making extra space for social distancing. What can you do to stay safe? We all have a role to play in keeping ourselves safe and preventing the spread of COVID-19. Here are some things you can do while you're receiving care. If you're in a hospital, stay in your room. This will limit your exposure to the virus. It may be boring, but it's the safest place for you. Wash your hands often. Use soap and water, and scrub for 20 seconds. Then rinse and dry them well. Always wash them after you use the bathroom, before you eat, and after you cough, sneeze, or blow your nose. If you can't wash your hands, use hand . Speak up if you have safety concerns. Don't be shy to correct health care workers if they aren't washing their hands properly, wearing face masks, or taking other precautions. These actions are vital to prevent the spread of infection. Try to be understanding. This is a stressful time for everyone, including your care team. They are doing their best to keep you safe and provide you with good care. Where can you learn more? Go to http://www.gray.com/  Enter A134 in the search box to learn more about \"Learning About How Hospitals and [facility] Safe From COVID-19. \"  Current as of: March 26, 2021               Content Version: 13.0  © 4047-1320 [employer]. Care instructions adapted under license by [employer] (which disclaims liability or warranty for this information). If you have questions about a medical condition or this instruction, always ask your healthcare professional. [address] any warranty or liability for your use of this information.

## 2022-03-18 PROBLEM — I63.9 CVA (CEREBRAL VASCULAR ACCIDENT) (HCC): Status: ACTIVE | Noted: 2020-01-27

## 2022-03-18 PROBLEM — Z86.69 HISTORY OF AMAUROSIS FUGAX: Status: ACTIVE | Noted: 2021-02-26

## 2022-03-18 PROBLEM — E27.8 ADRENAL NODULE (HCC): Status: ACTIVE | Noted: 2021-06-18

## 2022-03-18 PROBLEM — R73.9 HYPERGLYCEMIA: Status: ACTIVE | Noted: 2020-06-09

## 2022-03-18 PROBLEM — B35.6 JOCK ITCH: Status: ACTIVE | Noted: 2020-10-06

## 2022-03-18 PROBLEM — K52.832 LYMPHOCYTIC COLITIS: Status: ACTIVE | Noted: 2021-06-18

## 2022-03-19 PROBLEM — E04.1 LEFT THYROID NODULE: Status: ACTIVE | Noted: 2021-02-26

## 2022-03-19 PROBLEM — M25.551 CHRONIC HIP PAIN, BILATERAL: Status: ACTIVE | Noted: 2020-06-09

## 2022-03-19 PROBLEM — M48.062 SPINAL STENOSIS OF LUMBAR REGION WITH NEUROGENIC CLAUDICATION: Status: ACTIVE | Noted: 2020-10-06

## 2022-03-19 PROBLEM — M25.552 CHRONIC HIP PAIN, BILATERAL: Status: ACTIVE | Noted: 2020-06-09

## 2022-03-19 PROBLEM — Z90.49 S/P CHOLECYSTECTOMY: Status: ACTIVE | Noted: 2021-02-09

## 2022-03-19 PROBLEM — J44.9 CHRONIC OBSTRUCTIVE PULMONARY DISEASE, UNSPECIFIED COPD TYPE (HCC): Status: ACTIVE | Noted: 2022-02-01

## 2022-03-19 PROBLEM — R94.30 EJECTION FRACTION < 50%: Status: ACTIVE | Noted: 2020-06-09

## 2022-03-19 PROBLEM — G89.29 CHRONIC HIP PAIN, BILATERAL: Status: ACTIVE | Noted: 2020-06-09

## 2022-03-19 PROBLEM — H02.403 DROOPY EYELID, BILATERAL: Status: ACTIVE | Noted: 2020-06-09

## 2022-03-19 PROBLEM — G45.3 AMAUROSIS FUGAX: Status: ACTIVE | Noted: 2020-01-29

## 2022-03-20 PROBLEM — G89.29 CHRONIC PAIN OF BOTH SHOULDERS: Status: ACTIVE | Noted: 2020-06-09

## 2022-03-20 PROBLEM — M25.511 CHRONIC PAIN OF BOTH SHOULDERS: Status: ACTIVE | Noted: 2020-06-09

## 2022-03-20 PROBLEM — I10 ESSENTIAL HYPERTENSION: Status: ACTIVE | Noted: 2020-06-09

## 2022-03-20 PROBLEM — F17.200 SMOKER: Status: ACTIVE | Noted: 2020-06-09

## 2022-03-20 PROBLEM — M25.512 CHRONIC PAIN OF BOTH SHOULDERS: Status: ACTIVE | Noted: 2020-06-09

## 2022-03-20 PROBLEM — E78.00 PURE HYPERCHOLESTEROLEMIA: Status: ACTIVE | Noted: 2021-02-26

## 2022-03-20 PROBLEM — K52.9 CHRONIC DIARRHEA: Status: ACTIVE | Noted: 2021-02-09

## 2022-03-20 PROBLEM — I65.29 INTERNAL CAROTID ARTERY STENOSIS: Status: ACTIVE | Noted: 2020-01-29

## 2022-04-14 RX ORDER — ALBUTEROL SULFATE 90 UG/1
2 AEROSOL, METERED RESPIRATORY (INHALATION)
Qty: 6.7 G | Refills: 2 | Status: SHIPPED | OUTPATIENT
Start: 2022-04-14 | End: 2022-06-30 | Stop reason: SDUPTHER

## 2022-06-27 RX ORDER — ATORVASTATIN CALCIUM 80 MG/1
80 TABLET, FILM COATED ORAL DAILY
Qty: 90 TABLET | Refills: 1 | Status: SHIPPED | OUTPATIENT
Start: 2022-06-27 | End: 2022-08-01

## 2022-06-30 RX ORDER — ALBUTEROL SULFATE 90 UG/1
AEROSOL, METERED RESPIRATORY (INHALATION)
Qty: 6.7 G | Refills: 2 | Status: SHIPPED | OUTPATIENT
Start: 2022-06-30

## 2023-02-27 ENCOUNTER — HOSPITAL ENCOUNTER (EMERGENCY)
Age: 73
Discharge: HOME | End: 2023-02-28
Attending: EMERGENCY MEDICINE
Payer: MEDICARE

## 2023-02-27 VITALS
SYSTOLIC BLOOD PRESSURE: 167 MMHG | RESPIRATION RATE: 18 BRPM | OXYGEN SATURATION: 100 % | WEIGHT: 170 LBS | DIASTOLIC BLOOD PRESSURE: 78 MMHG | HEART RATE: 89 BPM | HEIGHT: 69 IN | TEMPERATURE: 97.9 F | BODY MASS INDEX: 25.18 KG/M2

## 2023-02-27 DIAGNOSIS — F43.10 PTSD (POST-TRAUMATIC STRESS DISORDER): ICD-10-CM

## 2023-02-27 DIAGNOSIS — E16.2 HYPOGLYCEMIA, UNSPECIFIED: ICD-10-CM

## 2023-02-27 DIAGNOSIS — F10.920 ALCOHOLIC INTOXICATION WITHOUT COMPLICATION (HCC): Primary | ICD-10-CM

## 2023-02-27 DIAGNOSIS — I10 ACCELERATED HYPERTENSION: ICD-10-CM

## 2023-02-27 PROCEDURE — 99284 EMERGENCY DEPT VISIT MOD MDM: CPT

## 2023-02-28 ENCOUNTER — APPOINTMENT (OUTPATIENT)
Dept: CT IMAGING | Age: 73
End: 2023-02-28
Attending: EMERGENCY MEDICINE
Payer: MEDICARE

## 2023-02-28 LAB
ALBUMIN SERPL-MCNC: 3.6 G/DL (ref 3.5–5)
ALBUMIN/GLOB SERPL: 1 (ref 1.1–2.2)
ALP SERPL-CCNC: 69 U/L (ref 45–117)
ALT SERPL-CCNC: 34 U/L (ref 12–78)
AMPHET UR QL SCN: NEGATIVE
ANION GAP SERPL CALC-SCNC: 6 MMOL/L (ref 5–15)
AST SERPL-CCNC: 37 U/L (ref 15–37)
BARBITURATES UR QL SCN: NEGATIVE
BASOPHILS # BLD: 0.1 K/UL (ref 0–0.1)
BASOPHILS NFR BLD: 1 % (ref 0–1)
BENZODIAZ UR QL: NEGATIVE
BILIRUB SERPL-MCNC: 0.3 MG/DL (ref 0.2–1)
BUN SERPL-MCNC: 10 MG/DL (ref 6–20)
BUN/CREAT SERPL: 14 (ref 12–20)
CALCIUM SERPL-MCNC: 8.4 MG/DL (ref 8.5–10.1)
CANNABINOIDS UR QL SCN: NEGATIVE
CHLORIDE SERPL-SCNC: 108 MMOL/L (ref 97–108)
CO2 SERPL-SCNC: 26 MMOL/L (ref 21–32)
COCAINE UR QL SCN: NEGATIVE
CREAT SERPL-MCNC: 0.7 MG/DL (ref 0.7–1.3)
DIFFERENTIAL METHOD BLD: ABNORMAL
DRUG SCRN COMMENT,DRGCM: NORMAL
EOSINOPHIL # BLD: 0.1 K/UL (ref 0–0.4)
EOSINOPHIL NFR BLD: 1 % (ref 0–7)
ERYTHROCYTE [DISTWIDTH] IN BLOOD BY AUTOMATED COUNT: 16.6 % (ref 11.5–14.5)
ETHANOL SERPL-MCNC: 113 MG/DL
GLOBULIN SER CALC-MCNC: 3.5 G/DL (ref 2–4)
GLUCOSE BLD STRIP.AUTO-MCNC: 193 MG/DL (ref 65–117)
GLUCOSE BLD STRIP.AUTO-MCNC: 40 MG/DL (ref 65–117)
GLUCOSE BLD STRIP.AUTO-MCNC: 64 MG/DL (ref 65–117)
GLUCOSE SERPL-MCNC: 38 MG/DL (ref 65–100)
HCT VFR BLD AUTO: 33.2 % (ref 36.6–50.3)
HGB BLD-MCNC: 10.8 G/DL (ref 12.1–17)
IMM GRANULOCYTES # BLD AUTO: 0 K/UL (ref 0–0.04)
IMM GRANULOCYTES NFR BLD AUTO: 0 % (ref 0–0.5)
LYMPHOCYTES # BLD: 3.6 K/UL (ref 0.8–3.5)
LYMPHOCYTES NFR BLD: 44 % (ref 12–49)
MCH RBC QN AUTO: 29.8 PG (ref 26–34)
MCHC RBC AUTO-ENTMCNC: 32.5 G/DL (ref 30–36.5)
MCV RBC AUTO: 91.7 FL (ref 80–99)
METHADONE UR QL: NEGATIVE
MONOCYTES # BLD: 1 K/UL (ref 0–1)
MONOCYTES NFR BLD: 12 % (ref 5–13)
NEUTS SEG # BLD: 3.5 K/UL (ref 1.8–8)
NEUTS SEG NFR BLD: 42 % (ref 32–75)
NRBC # BLD: 0 K/UL (ref 0–0.01)
NRBC BLD-RTO: 0 PER 100 WBC
OPIATES UR QL: NEGATIVE
PCP UR QL: NEGATIVE
PLATELET # BLD AUTO: 145 K/UL (ref 150–400)
PMV BLD AUTO: ABNORMAL FL (ref 8.9–12.9)
POTASSIUM SERPL-SCNC: 3.9 MMOL/L (ref 3.5–5.1)
PROT SERPL-MCNC: 7.1 G/DL (ref 6.4–8.2)
RBC # BLD AUTO: 3.62 M/UL (ref 4.1–5.7)
SERVICE CMNT-IMP: ABNORMAL
SODIUM SERPL-SCNC: 140 MMOL/L (ref 136–145)
WBC # BLD AUTO: 8.4 K/UL (ref 4.1–11.1)

## 2023-02-28 PROCEDURE — 80307 DRUG TEST PRSMV CHEM ANLYZR: CPT

## 2023-02-28 PROCEDURE — 82077 ASSAY SPEC XCP UR&BREATH IA: CPT

## 2023-02-28 PROCEDURE — 85025 COMPLETE CBC W/AUTO DIFF WBC: CPT

## 2023-02-28 PROCEDURE — 80053 COMPREHEN METABOLIC PANEL: CPT

## 2023-02-28 PROCEDURE — 93005 ELECTROCARDIOGRAM TRACING: CPT

## 2023-02-28 PROCEDURE — 82962 GLUCOSE BLOOD TEST: CPT

## 2023-02-28 RX ORDER — ACETAMINOPHEN 325 MG/1
650 TABLET ORAL
Status: DISCONTINUED | OUTPATIENT
Start: 2023-02-28 | End: 2023-02-28 | Stop reason: HOSPADM

## 2023-02-28 RX ORDER — DEXTROSE MONOHYDRATE 100 MG/ML
0-250 INJECTION, SOLUTION INTRAVENOUS AS NEEDED
Status: DISCONTINUED | OUTPATIENT
Start: 2023-02-28 | End: 2023-02-28 | Stop reason: HOSPADM

## 2023-02-28 NOTE — ED PROVIDER NOTES
EMERGENCY DEPARTMENT HISTORY AND PHYSICAL EXAM            Please note that this dictation was completed with the assistance of \"Dragon\", the computer voice recognition software. Quite often unanticipated grammatical, syntax, homophones, and other interpretive errors are inadvertently transcribed by the computer software. Please disregard these errors and any errors that have escaped final proofreading. Thank you. Date of Evaluation: 02/28/23  Patient: Ronal Cadena  Patient Age and Sex: 67 y.o. male   MRN: [id number]  CSN: [id number]  PCP: Rduy Soto DO    History of Present Illness     Chief Complaint   Patient presents with    Alcohol intoxication     History Provided By: Patient/family/EMS (if available)    HPI Limitations : Intoxication    HPI: Ronal Cadena, 67 y.o. male with past medical history as documented below presents to the ED with c/o of altered mental status and concerns for alcohol intoxication. According to EMS, bystanders called because patient was heavily intoxicated and stated that he was having PTSD. Patient reports consuming multiple cans of beer tonight. Denies any traumas or falls. Patient initially uncooperative for staff. Upon arrival, patient requesting food and stated that he wanted to go back to the shelter. Pt denies any other exacerbating or ameliorating factors. There are no other complaints, changes or physical findings pertinent to the HPI at this time. Nursing Notes were all reviewed and agreed with or any disagreements were addressed in the HPI.     Past History   Past Medical History:  Past Medical History:   Diagnosis Date    Arrhythmia     \"heart murmur that comes and goes\"    Arthritis     Bruising     Upper extremities and hands    Chronic pain     stenosis of the spine    Coagulation disorder ([address]     on plavix    Hypertension     Ill-defined condition     elevated cholesterol    Ill-defined condition     \"blood clots both carotid arteries\" Ill-defined condition     some decreased vision right eye/drooping of left eyelid    Ill-defined condition     \"knot on right side thyroid gland\" - found with carotid test - to be followed up    Rheumatic fever     caused a heart murmur    Seizures (HCC)     hx of/told while in the [employer] they were d/t anxiety     Stroke ([facility] [address]     no deficits/visual problems at the time       Past Surgical History:  Past Surgical History:   Procedure Laterality Date    COLONOSCOPY N/A 4/16/2021    COLONOSCOPY AND UPPER ENDOSCOPY   :- performed by Liyah Obrien MD at [facility] ENDOSCOPY    HX APPENDECTOMY      HX CHOLECYSTECTOMY      HX GI      colonoscopy - polyps removed    HX GI      stones removed after cholecystectomy    HX HEENT      eyelids lifted d/t drooping    HX TONSILLECTOMY         Family History:   Family history reviewed and was non-contributory, unless specified below:  Family History   Problem Relation Age of Onset    Pancreatic Cancer Father     Hypertension Father     Cancer Mother         larynx/\"windpipe area\"       Social History:  Social History     Tobacco Use    Smoking status: Every Day     Packs/day: 0.50     Types: Cigarettes    Smokeless tobacco: Former   Vaping Use    Vaping Use: Never used   Substance Use Topics    Alcohol use: Not Currently     Comment: yrs ago    Drug use: Never       Allergies:  No Known Allergies    Current Medications:  No current facility-administered medications on file prior to encounter.      Current Outpatient Medications on File Prior to Encounter   Medication Sig Dispense Refill    atorvastatin (LIPITOR) 80 mg tablet TAKE 1 TABLET BY MOUTH DAILY 90 Tablet 0    clopidogreL (PLAVIX) 75 mg tab TAKE 1 TABLET BY MOUTH DAILY 90 Tablet 0    albuterol (PROVENTIL HFA, VENTOLIN HFA, PROAIR HFA) 90 mcg/actuation inhaler INHALE 2 PUFFS BY MOUTH EVERY 6 HOURS AS NEEDED FOR WHEEZING 6.7 g 2    losartan-hydroCHLOROthiazide (HYZAAR) 100-25 mg per tablet TAKE 1 TABLET BY MOUTH EVERY DAY 90 Tablet 1    hydrocortisone (HYTONE) 2.5 % topical cream APPLY TO GROIN TWICE DAILY FOR 14 DAYS      nystatin (MYCOSTATIN) topical cream APPLY TO GROIN TWICE DAILY FOR 14 DAYS      budesonide (ENTOCORT EC) 3 mg capsule TAKE 3 CAPSULES BY MOUTH DAILY FOR 8 WEEKS      TURMERIC PO Take 1 Cap by mouth daily. multivit-min/FA/lycopen/lutein (MEN 50 PLUS MULTIVITAMIN PO) Take  by mouth. Takes one po once daily. acetaminophen (Tylenol Extra Strength) 500 mg tablet Take 1,000 mg by mouth nightly. menthol/camphor (BIOFREEZE EX) by Apply Externally route as needed. Roll on      FLUTICASONE PROPIONATE NA 1 [place] by Both Nostrils route two (2) times daily as needed. 50 mcg      aspirin (ASPIRIN) 325 mg tablet Take 325 mg by mouth daily. cholecalciferol (VITAMIN D3) 25 mcg (1,000 unit) cap Take 1,000 Units by mouth two (2) times a day. Review of Systems   A complete ROS was reviewed by me today and all other systems negative, unless otherwise specified below:  Review of Systems   Unable to perform ROS: Mental status change   Physical Exam   Patient Vitals for the past 24 hrs:   Temp Pulse Resp BP SpO2   02/27/23 2335 97.9 °F (36.6 °C) 89 18 (!) 167/78 100 %       Physical Exam  Vitals and nursing note reviewed. Constitutional:       General: He is in acute distress. Appearance: He is well-developed. He is toxic-appearing. Comments: Smells of alcohol  Clothing covered with dirt and saturated with urine   HENT:      Head: Normocephalic and atraumatic. Ears:      Comments: No tongue ecchymosis or laceration or other stigmata of seizure     Mouth/Throat:      Pharynx: No posterior oropharyngeal erythema. Eyes:      Conjunctiva/sclera: Conjunctivae normal.      Pupils: Pupils are equal, round, and reactive to light. Cardiovascular:      Rate and Rhythm: Normal rate and regular rhythm. Heart sounds: Normal heart sounds. No murmur heard. No friction rub. No gallop.    Pulmonary: Effort: Pulmonary effort is normal. No respiratory distress. Breath sounds: Normal breath sounds. No wheezing or rales. Chest:      Chest wall: No tenderness. Abdominal:      General: Bowel sounds are normal. There is no distension. Palpations: Abdomen is soft. There is no mass. Tenderness: There is no abdominal tenderness. There is no guarding or rebound. Musculoskeletal:         General: No tenderness or deformity. Normal range of motion. Cervical back: Normal range of motion. Skin:     General: Skin is warm. Findings: No rash. Neurological:      Mental Status: He is alert and oriented to person, place, and time. Cranial Nerves: No cranial nerve deficit. Motor: No abnormal muscle tone. Coordination: Coordination normal.   Psychiatric:         Attention and Perception: Attention and perception normal.         Mood and Affect: Mood and affect normal.         Speech: Speech normal.         Behavior: Behavior is uncooperative. Thought Content: Thought content normal. Thought content is not paranoid or delusional. Thought content does not include homicidal or suicidal ideation. Thought content does not include homicidal or suicidal plan. Diagnostic Studies     LABORATORY RESULTS:  I have personally reviewed and interpreted all available laboratory results.    Recent Results (from the past 24 hour(s))   EKG, 12 LEAD, INITIAL    Collection Time: 02/28/23 12:23 AM   Result Value Ref Range    Ventricular Rate 65 BPM    Atrial Rate 65 BPM    P-R Interval 138 ms    QRS Duration 102 ms    Q-T Interval 432 ms    QTC Calculation (Bezet) 449 ms    Calculated P Axis 76 degrees    Calculated R Axis 6 degrees    Calculated T Axis 12 degrees    Diagnosis       Normal sinus rhythm  Incomplete right bundle branch block  Septal infarct , age undetermined  ST & T wave abnormality, consider lateral ischemia  Abnormal ECG  When compared with ECG of 03-NOV-2021 15:03,  Incomplete right bundle branch block is now present  Septal infarct is now present  Criteria for Inferior infarct are no longer present     CBC WITH AUTOMATED DIFF    Collection Time: 02/28/23 12:24 AM   Result Value Ref Range    WBC 8.4 4.1 - 11.1 K/uL    RBC 3.62 (L) 4.10 - 5.70 M/uL    HGB 10.8 (L) 12.1 - 17.0 g/dL    HCT 33.2 (L) 36.6 - 50.3 %    MCV 91.7 80.0 - 99.0 FL    MCH 29.8 26.0 - 34.0 PG    MCHC 32.5 30.0 - 36.5 g/dL    RDW 16.6 (H) 11.5 - 14.5 %    PLATELET 213 (L) 449 - 400 K/uL    MPV ABNORMAL 8.9 - 12.9 FL    NRBC 0.0 0  WBC    ABSOLUTE NRBC 0.00 0.00 - 0.01 K/uL    NEUTROPHILS 42 32 - 75 %    LYMPHOCYTES 44 12 - 49 %    MONOCYTES 12 5 - 13 %    EOSINOPHILS 1 0 - 7 %    BASOPHILS 1 0 - 1 %    IMMATURE GRANULOCYTES 0 0.0 - 0.5 %    ABS. NEUTROPHILS 3.5 1.8 - 8.0 K/UL    ABS. LYMPHOCYTES 3.6 (H) 0.8 - 3.5 K/UL    ABS. MONOCYTES 1.0 0.0 - 1.0 K/UL    ABS. EOSINOPHILS 0.1 0.0 - 0.4 K/UL    ABS. BASOPHILS 0.1 0.0 - 0.1 K/UL    ABS. IMM. GRANS. 0.0 0.00 - 0.04 K/UL    DF AUTOMATED     METABOLIC PANEL, COMPREHENSIVE    Collection Time: 02/28/23 12:24 AM   Result Value Ref Range    Sodium 140 136 - 145 mmol/L    Potassium 3.9 3.5 - 5.1 mmol/L    Chloride 108 97 - 108 mmol/L    CO2 26 21 - 32 mmol/L    Anion gap 6 5 - 15 mmol/L    Glucose 38 (LL) 65 - 100 mg/dL    BUN 10 6 - 20 MG/DL    Creatinine 0.70 0.70 - 1.30 MG/DL    BUN/Creatinine ratio 14 12 - 20      eGFR >60 >60 ml/min/1.73m2    Calcium 8.4 (L) 8.5 - 10.1 MG/DL    Bilirubin, total 0.3 0.2 - 1.0 MG/DL    ALT (SGPT) 34 12 - 78 U/L    AST (SGOT) 37 15 - 37 U/L    Alk.  phosphatase 69 45 - 117 U/L    Protein, total 7.1 6.4 - 8.2 g/dL    Albumin 3.6 3.5 - 5.0 g/dL    Globulin 3.5 2.0 - 4.0 g/dL    A-G Ratio 1.0 (L) 1.1 - 2.2     ETHYL ALCOHOL    Collection Time: 02/28/23 12:24 AM   Result Value Ref Range    ALCOHOL(ETHYL),SERUM 113 (H) <10 MG/DL   GLUCOSE, POC    Collection Time: 02/28/23 12:24 AM   Result Value Ref Range    Glucose (POC) 40 (LL) 65 - 117 mg/dL    Performed by Ron Amaya RN    GLUCOSE, POC    Collection Time: 02/28/23 12:28 AM   Result Value Ref Range    Glucose (POC) 64 (L) 65 - 117 mg/dL    Performed by Ron Amaya RN    GLUCOSE, POC    Collection Time: 02/28/23  1:08 AM   Result Value Ref Range    Glucose (POC) 193 (H) 65 - 117 mg/dL    Performed by Ron Amaya RN    DRUG SCREEN, URINE    Collection Time: 02/28/23  1:30 AM   Result Value Ref Range    AMPHETAMINES Negative NEG      BARBITURATES Negative NEG      BENZODIAZEPINES Negative NEG      COCAINE Negative NEG      METHADONE Negative NEG      OPIATES Negative NEG      PCP(PHENCYCLIDINE) Negative NEG      THC (TH-CANNABINOL) Negative NEG      Drug screen comment (NOTE)        RADIOLOGY RESULTS:  I have personally reviewed and interpreted all available imaging studies and agree with radiology interpretation. No orders to display     CT Results  (Last 48 hours)      None          CXR Results  (Last 48 hours)      None          No orders to display        [address]   I am the first and primary ED physician for this patient's ED visit today. I reviewed our EMR for any past records that may contribute to the patient's current condition, including their past medical, surgical, social and family history. This also includes their most recent ED visits, previous hospitalizations and prior diagnostic data. I have reviewed and summarized the most pertinent findings in my HPI and MDM.     Vital Signs Reviewed:  Patient Vitals for the past 24 hrs:   Temp Pulse Resp BP SpO2   02/27/23 2335 97.9 °F (36.6 °C) 89 18 (!) 167/78 100 %     Pulse Oximetry Analysis: 100% on RA with good pleth    Cardiac Monitor:   Rate: 89 bpm  The cardiac monitor revealed the following rhythm as interpreted by me: Normal Sinus Rhythm  Cardiac monitoring was ordered to monitor patient for signs of cardiac dysrhythmia, which they are at risk for based on their history and/or risk for cardiovascular disease and/or metabolic abnormalities. EKG interpretation:   Billable EKG reviewed by ED Physician in the absence of a cardiologist: Yes  Rhythm: normal sinus rhythm; and regular . Rate (approx.): 65; Axis: normal; P wave: normal; QRS interval: normal ; ST/T wave: non-specific changes; Other findings: incomplete RBBB. This EKG was interpreted by Genny Rodriguez MD     Records Reviewed: Nursing Notes, Old Medical Records, Previous electrocardiograms, Previous Radiology Studies and Previous Laboratory Studies, EMS reports    DIFFERENTIAL DIAGNOSIS AND MDM:  Pt presents with alcohol intoxication. Pt denies any co-ingestion or self harm. No evidence of drug overdose. Stable vitals and no signs of trauma on exam. DDx: other toxidrome/intoxication, seizure, syncope. Given the story and most likely ETOH intoxication, Will get POC glucose, provide IVF's, goody bag, treat symptomatically for nausea/vomiting, check ETOH level, drug screen and monitor closely. Will allow patient to metabolize to freedom and ensure patient can keep down PO and ambulate prior to discharge. Review of Prior Records and External Documents:   reviewed: YES - I have reviewed the patient's controlled substance prescription history thru the Prescription Monitoring Program so that the prescription(s) for a controlled substance can be given. Prior hospital discharge summaries and clinic notes reviewed: Reviewed discharge summary from January 27, 2020. Patient was admitted for transient left eye blurry vision, amaurosis fugax, and right carotid artery stenosis. Independent History: An independent clinically history was obtained from EMS. They stated that patient was called because of confusion and alcohol intoxication. Patient does have a history of PTSD and normally goes to the [facility].  No trauma for them on exam.  Stable vital signs.      Social Determinants of Health:  Patients evaluation and management were significantly impacted by social determinants of health. Social Determinants affecting Dx or Tx: Patient is homeless. Patient has a substance abuse problem. TOBACCO COUNSELING:  Upon evaluation, pt expressed that they are a current tobacco user. For approximately 5 mins, pt has been counseled on the dangers of smoking and was encouraged to quit as soon as possible in order to decrease further risks to their health. Pt has conveyed their understanding of the risks involved should they continue to use tobacco products. HYPERTENSION COUNSELING  For 7 minutes, education was provided to the patient today regarding their hypertension. Patient is made aware of their elevated blood pressure and is instructed to follow up this week with their Primary Care for a recheck. Patient is counseled regarding consequences of chronic, uncontrolled hypertension including kidney disease, heart disease, stroke or even death. Patient states their understanding and agrees to follow up this week. Additionally, during their visit, I discussed sodium restriction, maintaining ideal body weight and regular exercise program as physiologic means to achieve blood pressure control. The patient will strive towards this. Social History     Socioeconomic History    Marital status:    Tobacco Use    Smoking status: Every Day     Packs/day: 0.50     Types: Cigarettes    Smokeless tobacco: Former   Vaping Use    Vaping Use: Never used   Substance and Sexual Activity    Alcohol use: Not Currently     Comment: yrs ago    Drug use: Never     ED Course: Progress Notes, Reevaluation, and Consults:  Initial assessment performed. I discussed presenting problems and concerns, and my formulated plan for today's visit with the patient and any available family members. I have encouraged them to ask questions as they arise throughout the visit.      ED Physician Orders:   Orders Placed This Encounter    CBC WITH AUTOMATED DIFF    METABOLIC PANEL, COMPREHENSIVE    BLOOD ALCOHOL (Ethyl Alcohol)    DRUG SCREEN, URINE    POC GLUCOSE    CARDIAC/RESPIRATORY MONITORING    FEED PATIENT    APPLY ACE WRAP:SPECIFY ONE TIME STAT    GLUCOSE, POC    GLUCOSE, POC    GLUCOSE, POC    EKG 12 LEAD INITIAL    INSERT PERIPHERAL IV ONE TIME STAT    sodium chloride 0.9 % bolus infusion 1,000 mL    dextrose 10% infusion 0-250 mL    acetaminophen (TYLENOL) tablet 650 mg     ED Medications Given:   Medications   sodium chloride 0.9 % bolus infusion 1,000 mL (has no administration in time range)   dextrose 10% infusion 0-250 mL (has no administration in time range)   acetaminophen (TYLENOL) tablet 650 mg (650 mg Oral Refused 2/28/23 0128)     ED Physician Interpretation of Test Results: All results were independently reviewed and interpreted by myself, notably showing:     RADIOLOGY:  Non-plain film images such as CT, ultrasound and MRI are read by the radiologist. Germania Myers radiographic images are visualized and preliminarily interpreted by the ED Provider with the below findings:     Imaging interpreted by me:     Interpretation per the Radiologist below, if available at the time of this note:     No orders to display     CT Results  (Last 48 hours)      None          CXR Results  (Last 48 hours)      None          No orders to display     My interpretation of EKG: No STEMI. See my EKG interpretation in ED course above. My interpretation of laboratory results:   Labs showing CMP with normal sodium and potassium, intact renal function. Glucose was 38 but improved to 193 after oral glucose. CBC with hemoglobin 10.8. White count 8.4. Alcohol level elevated at 113. Urine drug screen unremarkable. Progress Note:  I have just re-evaluated the patient. Pt reports improvement of his symptoms after fluids.  I have reviewed his vital signs and determined there is currently no worsening in their condition or physical exam. Results have been reviewed with them and their questions have been answered. I will continue to review further results as they come available. Progress Note:  ED Course as of 02/28/23 0502   Tue Feb 28, 2023   0040 Pt awake, oriented x 3, refusing CT head. [HW]      ED Course User Index  [HW] Izabella Nieves MD      Amount and/or Complexity of Data Reviewed  HIGH complexity decision making performed   Presentation: ACUTE and SEVERE  Clinical lab tests: ordered as appropriate & reviewed  Tests in the medicine section of CPT®: ordered as appropriate & reviewed  Obtain history from someone other than the patient: yes  Review and summarize past medical records: yes  Independent visualization of images, tracings, or specimens: yes    Risks  OTC drugs. Prescription drug management. Parenteral controlled substances. Drug therapy requiring intensive monitoring for toxicity. Shared Decision Making: I considered performing CT head imaging but did not after shared decision making with patient due to patient refusal and no evidence of trauma on exam.  Patient does admit to drinking but is clinically sober and able to tell me risks and benefits of CT imaging and still refuses. DISCHARGE  Pt reassessed and symptoms noted to have improved significantly after ED treatment. Gi Johnson's labs and imaging have been reviewed with him and available family. He verbally conveys understanding and agreement of the signs, symptoms, diagnosis, treatment and prognosis and additionally agrees to follow up as recommended with Dr. Dimitris Malave DO and/or specialist as instructed. He agrees with the care plan we have created and conveys that all of his questions have been answered.  Additionally, I have put together a packet of discharge instructions for him that include: 1) Educational information regarding their diagnosis, 2) How to care for their diagnosis at home, as well a 3) List of reasons why they would want to return to the ED prior to their follow-up appointment should their condition change or symptoms worsen. I have answered all questions to the patient's satisfaction. Strict return precautions given. He and/or family conveyed understanding and agreement with care plan. Vital signs stable for discharge. PLAN  1. Return precautions as discussed with patient and available family/caregiver. 2.   Current Discharge Medication List        Current Facility-Administered Medications:     dextrose 10% infusion 0-250 mL, 0-250 mL, IntraVENous, PRN, Marcela Campos MD    acetaminophen (TYLENOL) tablet 650 mg, 650 mg, Oral, NOW, Marcela Campos MD    sodium chloride 0.9 % bolus infusion 1,000 mL, 1,000 mL, IntraVENous, ONCE, Marcela Campos MD    Current Outpatient Medications:     atorvastatin (LIPITOR) 80 mg tablet, TAKE 1 TABLET BY MOUTH DAILY, Disp: 90 Tablet, Rfl: 0    clopidogreL (PLAVIX) 75 mg tab, TAKE 1 TABLET BY MOUTH DAILY, Disp: 90 Tablet, Rfl: 0    albuterol (PROVENTIL HFA, VENTOLIN HFA, PROAIR HFA) 90 mcg/actuation inhaler, INHALE 2 PUFFS BY MOUTH EVERY 6 HOURS AS NEEDED FOR WHEEZING, Disp: 6.7 g, Rfl: 2    losartan-hydroCHLOROthiazide (HYZAAR) 100-25 mg per tablet, TAKE 1 TABLET BY MOUTH EVERY DAY, Disp: 90 Tablet, Rfl: 1    hydrocortisone (HYTONE) 2.5 % topical cream, APPLY TO GROIN TWICE DAILY FOR 14 DAYS, Disp: , Rfl:     nystatin (MYCOSTATIN) topical cream, APPLY TO GROIN TWICE DAILY FOR 14 DAYS, Disp: , Rfl:     budesonide (ENTOCORT EC) 3 mg capsule, TAKE 3 CAPSULES BY MOUTH DAILY FOR 8 WEEKS, Disp: , Rfl:     TURMERIC PO, Take 1 Cap by mouth daily. , Disp: , Rfl:     multivit-min/FA/lycopen/lutein (MEN 50 PLUS MULTIVITAMIN PO), Take  by mouth. Takes one po once daily. , Disp: , Rfl:     acetaminophen (Tylenol Extra Strength) 500 mg tablet, Take 1,000 mg by mouth nightly., Disp: , Rfl:     menthol/camphor (BIOFREEZE EX), by Apply Externally route as needed. Roll on, Disp: , Rfl:     FLUTICASONE PROPIONATE NA, 1 [place] by Both Nostrils route two (2) times daily as needed. 50 mcg, Disp: , Rfl:     aspirin (ASPIRIN) 325 mg tablet, Take 325 mg by mouth daily. , Disp: , Rfl:     cholecalciferol (VITAMIN D3) 25 mcg (1,000 unit) cap, Take 1,000 Units by mouth two (2) times a day., Disp: , Rfl:     3. Follow-up Information       Follow up With Specialties Details Why Contact Info    Ghada Capellan DO Internal Medicine Physician   [address] [place] [address]  [phone]      [facility] EMERGENCY DEPT Emergency Medicine   [facility]  [phone]          Instructed to return to ED if worse    FINAL DIAGNOSIS   Clinical Impression:  1. Alcoholic intoxication without complication ([facility] [address]    2. Hypoglycemia, unspecified    3. PTSD (post-traumatic stress disorder)    4. Accelerated hypertension      Attestation:  I am the attending of record for this patient. I personally performed the services described in this documentation on this date, 2/27/2023 for patient, Richard Leslie. I have reviewed the chart and verified that the record is accurate and complete.       Yanna Paz MD (Electronic Signature)

## 2023-02-28 NOTE — ED NOTES
Pt sitting up in bed eating meal tray.  Pt reports he has no place to go until 7am, usually goes to [facility] during the day and the shelter at [facility] [facility].

## 2023-02-28 NOTE — ED NOTES
Pt yelling in room, \"Nothing is wrong with my head! I don't need no scan! \" Pt demanding to know who called the ambulance and what cold weather shelter he was at.

## 2023-02-28 NOTE — ED NOTES
Discharge instructions were given to the patient by Sampson Man RN. The patient left the Emergency Department ambulatory, alert and oriented and in no acute distress with 0 prescriptions. The patient was encouraged to call or return to the ED for worsening issues or problems and was encouraged to schedule a follow up appointment for continuing care. The patient verbalized understanding of discharge instructions and prescriptions, all questions were answered. The patient has no further concerns at this time.

## 2023-02-28 NOTE — DISCHARGE INSTRUCTIONS
Ruddy Becker scheduled using triage protocol. Patients will be evaluated and referred to appropriate treatment. A  is usually assigned, but there is usually a waiting list. All [place] residents without financial resources may be referred to [facility]. Patients must bring proof that they are residents of the [address] ([place], rent receipt, picture ID, etc.).   [phone]  Crisis: [facility] and [facility]  [address] [facility]     [phone]  Detox unit: [address]  [facility]       Intakes are Monday, Wednesday, Thursday from 8 AM - 12 noon. Patients may walk in any day and speak with a counselor. Patient must bring a picture ID.   [phone]   The [facility]       No detox available. Patient must be medically stable and able to work. Patient needs social security card and an ID. Patient does not need to be homeless. [address] [place] [address]       Detoxification available. Patients must be medically-cleared to go to detox and must be free of benzodiazepines and barbituates. THP prefers if they also have Clonidine available to help them with their detox. [address] [place] program available for men. Patients need to be able to work and follow rules. 90 days inpatient followed by 90 days in a senior living house.    Amaris Harper  that hosts a number of [address] and NA groups each week   [phone]   [employer]  [address] [facility] Patients must first go through registration and financial eligibility screening, 8 - 11:30 AM and 1 - 4 PM Mondays through Friday. [facility] also provides homeless services, vision, dental and medical services. [address] outpatient and some inpatient (sober living houses) for men and women. Fees are determined at time of admission. Patient must be able to work and follow rules. [address] [place] for [facility]       Outpatient program for men, women and adolescents. Assessments can usually be scheduled within 24 hours. Intensive outpatient programs also available. Methadone and Suboxone detoxification also available. They do not accept Medicaid or Medicare. [address] [facility] Service Google End: [address] End: [address] [facility] Hospital[facility]onardo Ronald   Centralized Intake: [phone]  Crisis: Lester Mann. [phone]  Crisis: [facility]  [address] [facility]   [phone]  Crisis: [phone]   [address] Providers    Accepts Insured Patients Only:  [facility]  [address]       [phone]   Near the corner of [facility] and [address] [facility] [address] in the near [facility]. Accepts most insurance including Medicaid/Medicare. No psychiatry. On the [facility] bus line. [facility] [address] [phone]  [address] [facility] ([address]  [address] [facility]. 30 [facility], [address] [place])     [phone]   Accepts most major insurances. Psychiatry available. Some DBT groups. [facility])    [phone]   Mixture of psychologists and psychiatrists. They do not accept Medicaid or Medicare.     The [address]       [phone]   Mixture of clinical social workers and psychologists. Sliding Scale/Financial Aid/Differing Payment Options  [facility]  [address] [facility])      [phone]   Our own [facility] Miguel Angel and Miquel Mejía. Variety of treatment options, including DBT. [facility]  [address]       [phone]   Provides a variety of group and individual counseling options. Insurance, Medicaid, Medicare and sliding scale      Medicaid/Medicare providers in the [address] area  [address] [place] [address] Karli. [address]       [phone]    [facility]         [address]       [phone]    [place]  [address], [address]    [phone] ex.  [facility]     [phone]    [address]      [phone]      Services for patients without Medicaid, [place] or Insurance  The [address] [place] [address] Drive       [phone]   See handout in separate folder    An Harjinder Gifford

## 2023-02-28 NOTE — ED TRIAGE NOTES
Pt reports to ER via EMS due to being \"confused\" and having \"PTSD\" pt heavily intoxicated at this time. Notes having \"beer\" tonight. Slurring words and not forming clear sentences. Refusing to cooperate with answering this RNs questions.

## 2023-03-02 LAB
ATRIAL RATE: 65 BPM
CALCULATED P AXIS, ECG09: 76 DEGREES
CALCULATED R AXIS, ECG10: 6 DEGREES
CALCULATED T AXIS, ECG11: 12 DEGREES
DIAGNOSIS, 93000: NORMAL
P-R INTERVAL, ECG05: 138 MS
Q-T INTERVAL, ECG07: 432 MS
QRS DURATION, ECG06: 102 MS
QTC CALCULATION (BEZET), ECG08: 449 MS
VENTRICULAR RATE, ECG03: 65 BPM

## (undated) DEVICE — SNARE VASC L240CM LOOP W10MM SHTH DIA2.4MM RND STIFF CLD

## (undated) DEVICE — TRAP SURG QUAD PARABOLA SLOT DSGN SFTY SCRN TRAPEASE

## (undated) DEVICE — FORCEPS BX L240CM JAW DIA2.8MM L CAP W/ NDL MIC MESH TOOTH

## (undated) DEVICE — TUBING HYDR IRR --